# Patient Record
Sex: FEMALE | Race: WHITE | NOT HISPANIC OR LATINO | Employment: OTHER | URBAN - METROPOLITAN AREA
[De-identification: names, ages, dates, MRNs, and addresses within clinical notes are randomized per-mention and may not be internally consistent; named-entity substitution may affect disease eponyms.]

---

## 2018-08-28 ENCOUNTER — HOSPITAL ENCOUNTER (OUTPATIENT)
Dept: FAMILY MEDICINE CLINIC | Facility: CLINIC | Age: 63
Setting detail: SPECIMEN
Discharge: HOME | End: 2018-08-28
Attending: NURSE PRACTITIONER | Admitting: NURSE PRACTITIONER

## 2018-08-28 LAB
ANION GAP SERPL CALC-SCNC: 10 MMOL/L (ref 10–20)
BUN SERPL-MCNC: 9 MG/DL (ref 8–20)
BUN/CREAT SERPL: 10 (ref 5.4–26.2)
CALCIUM SERPL-MCNC: 9 MG/DL (ref 8.9–10.3)
CHLORIDE SERPL-SCNC: 105 MMOL/L (ref 101–111)
CHOLEST SERPL-MCNC: 155 MG/DL
CHOLEST/HDLC SERPL: 3.6 {RATIO}
CONV CO2: 30 MMOL/L (ref 22–32)
CONV LDL CHOLESTEROL DIRECT: 93 MG/DL (ref 0–100)
CREAT UR-MCNC: 0.9 MG/DL (ref 0.4–1)
GLUCOSE SERPL-MCNC: 110 MG/DL (ref 65–99)
HDLC SERPL-MCNC: 43 MG/DL
LDLC/HDLC SERPL: 2.1 {RATIO}
LIPID INTERPRETATION: NORMAL
POTASSIUM SERPL-SCNC: 4 MMOL/L (ref 3.6–5.1)
SODIUM SERPL-SCNC: 141 MMOL/L (ref 136–144)
TRIGL SERPL-MCNC: 104 MG/DL
VLDLC SERPL CALC-MCNC: 18.8 MG/DL

## 2019-05-16 ENCOUNTER — HOSPITAL ENCOUNTER (OUTPATIENT)
Dept: FAMILY MEDICINE CLINIC | Facility: CLINIC | Age: 64
Setting detail: SPECIMEN
Discharge: HOME | End: 2019-05-16
Attending: NURSE PRACTITIONER | Admitting: NURSE PRACTITIONER

## 2019-05-16 LAB
BACTERIA SPEC AEROBE CULT: NORMAL
BILIRUB UR QL STRIP: NEGATIVE MG/DL
CASTS URNS QL MICRO: ABNORMAL /[LPF]
COLOR UR: ABNORMAL
CONV BACTERIA IN URINE MICRO: NEGATIVE
CONV CLARITY OF URINE: ABNORMAL
CONV HYALINE CASTS IN URINE MICRO: 0 /[LPF] (ref 0–5)
CONV HYALINE CASTS IN URINE MICRO: ABNORMAL
CONV PROTEIN IN URINE BY AUTOMATED TEST STRIP: NEGATIVE MG/DL
CONV SMALL ROUND CELLS: ABNORMAL /[HPF]
CONV UROBILINOGEN IN URINE BY AUTOMATED TEST STRIP: 0.2 MG/DL
CULTURE INDICATED?: ABNORMAL
GLUCOSE UR QL: NEGATIVE MG/DL
HGB UR QL STRIP: ABNORMAL
KETONES UR QL STRIP: NEGATIVE MG/DL
LEUKOCYTE ESTERASE UR QL STRIP: ABNORMAL
Lab: NORMAL
MICRO REPORT STATUS: NORMAL
NITRITE UR QL STRIP: POSITIVE
PH UR STRIP.AUTO: 5.5 [PH] (ref 4.5–8)
RBC #/AREA URNS HPF: 3 /[HPF] (ref 0–3)
SP GR UR: 1.02 (ref 1–1.03)
SPECIMEN SOURCE: NORMAL
SPERM URNS QL MICRO: ABNORMAL /[HPF]
SQUAMOUS SPT QL MICRO: 33 /[HPF] (ref 0–5)
UNIDENT CRYS URNS QL MICRO: ABNORMAL /[HPF]
WBC #/AREA URNS HPF: 13 /[HPF] (ref 0–5)
YEAST SPEC QL WET PREP: ABNORMAL /[HPF]

## 2019-07-01 RX ORDER — ALBUTEROL SULFATE 90 UG/1
AEROSOL, METERED RESPIRATORY (INHALATION)
Qty: 1 INHALER | Refills: 5 | Status: SHIPPED | OUTPATIENT
Start: 2019-07-01 | End: 2021-04-02 | Stop reason: SDUPTHER

## 2019-08-02 ENCOUNTER — OFFICE VISIT (OUTPATIENT)
Dept: FAMILY MEDICINE CLINIC | Facility: CLINIC | Age: 64
End: 2019-08-02

## 2019-08-02 ENCOUNTER — LAB REQUISITION (OUTPATIENT)
Dept: LAB | Facility: HOSPITAL | Age: 64
End: 2019-08-02

## 2019-08-02 VITALS
SYSTOLIC BLOOD PRESSURE: 158 MMHG | TEMPERATURE: 98.6 F | DIASTOLIC BLOOD PRESSURE: 96 MMHG | OXYGEN SATURATION: 96 % | BODY MASS INDEX: 37.37 KG/M2 | HEART RATE: 86 BPM | WEIGHT: 210.9 LBS | RESPIRATION RATE: 20 BRPM | HEIGHT: 63 IN

## 2019-08-02 DIAGNOSIS — R31.9 HEMATURIA, UNSPECIFIED TYPE: Primary | ICD-10-CM

## 2019-08-02 DIAGNOSIS — R31.9 HEMATURIA: ICD-10-CM

## 2019-08-02 LAB
BILIRUB BLD-MCNC: NEGATIVE MG/DL
CLARITY, POC: ABNORMAL
COLOR UR: YELLOW
GLUCOSE UR STRIP-MCNC: NEGATIVE MG/DL
KETONES UR QL: NEGATIVE
LEUKOCYTE EST, POC: NEGATIVE
NITRITE UR-MCNC: NEGATIVE MG/ML
PH UR: 7 [PH] (ref 5–8)
PROT UR STRIP-MCNC: ABNORMAL MG/DL
RBC # UR STRIP: ABNORMAL /UL
SP GR UR: 1.01 (ref 1–1.03)
UROBILINOGEN UR QL: NORMAL

## 2019-08-02 PROCEDURE — 87086 URINE CULTURE/COLONY COUNT: CPT | Performed by: NURSE PRACTITIONER

## 2019-08-02 PROCEDURE — 81003 URINALYSIS AUTO W/O SCOPE: CPT | Performed by: NURSE PRACTITIONER

## 2019-08-02 PROCEDURE — 99212 OFFICE O/P EST SF 10 MIN: CPT | Performed by: NURSE PRACTITIONER

## 2019-08-02 NOTE — PROGRESS NOTES
Subjective   Quynh Du is a 63 y.o. female.     Chief Complaint   Patient presents with   • Blood in Urine     X 1 week   • Urinary Frequency       HPI  She is here says increased urination but she is drinking 64 ozs day. She had low abd pain. Happened one week ago she saw bright red blood in toilet. None of recent. Has urgency. No fevers no vomiting  Urine dip in office non hemolyzed trace will send for culture and sensitivity.  She has had blood in urine in past. No kidney stones as reported.       The following portions of the patient's history were reviewed and updated as appropriate: allergies, current medications, past family history, past medical history, past social history, past surgical history and problem list.      Current Outpatient Medications:   •  ALBUTEROL SULFATE  (90 Base) MCG/ACT inhaler, INHALE TWO PUFFS BY MOUTH EVERY 4 HOURS AS NEEDED, Disp: 1 inhaler, Rfl: 5    Recent Results (from the past 4032 hour(s))   Urinalysis With Culture If Indicated -    Collection Time: 05/16/19  8:55 AM   Result Value Ref Range    Color, UA DARK YELLOW (A) YELLOW    Appearance HAZY (A) CLEAR    Glucose, UA NEGATIVE NEGATIVE mg/dL    Bilirubin, UA NEGATIVE NEGATIVE mg/dL    Protein, UA NEGATIVE NEGATIVE mg/dL    pH, UA 5.5 4.5 - 8.0    Specific Gravity, UA 1.019 1.005 - 1.030    Blood, UA MODERATE (A) NEGATIVE    Ketones, UA NEGATIVE NEGATIVE mg/dL    Urobilinogen, UA 0.2 <2.0 mg/dL    Nitrite, UA POSITIVE (A) NEGATIVE    Leukocytes, UA TRACE (A) NEGATIVE    RBC, UA 3 0 - 3 /[HPF]    WBC, UA 13 (H) 0 - 5 /[HPF]    Bacteria, UA NEGATIVE NEGATIVE    Epithelial Cells, UA 33 (H) 0 - 5 /[HPF]    Hyaline Casts, UA 0 0 - 5 /[LPF]    Hyaline Casts, UA MICROSCOPIC WAS CHECKED ON UNSPUN URINE.     Casts NONE NONE /[LPF]    Crystals, UA NONE NONE /[HPF]    Small Round Cells NONE NONE /[HPF]    Yeast, UA NONE NONE /[HPF]    Sperm NONE NONE /[HPF]    Culture Indicated? CULTURE INDICATED AND SET UP    Urine Culture  "- Urine,    Collection Time: 05/16/19  8:55 AM   Result Value Ref Range    Specimen Description: URINE     Special Requests SPEC REFRIG     Culture       More than 2 organisms isolated. By Lab criteria this is considered    Culture       contamination, no further workup will be performed. Please submit another    Culture specimen.     Report Status 05/17/2019 FINAL    POCT urinalysis dipstick, automated    Collection Time: 08/02/19  3:12 PM   Result Value Ref Range    Color Yellow Yellow, Straw, Dark Yellow, Dang    Clarity, UA Hazy (A) Clear    Specific Gravity  1.015 1.005 - 1.030    pH, Urine 7.0 5.0 - 8.0    Leukocytes Negative Negative    Nitrite, UA Negative Negative    Protein, POC Trace (A) Negative mg/dL    Glucose, UA Negative Negative, 1000 mg/dL (3+) mg/dL    Ketones, UA Negative Negative    Urobilinogen, UA Normal Normal    Bilirubin Negative Negative    Blood, UA Trace (A) Negative         Review of Systems   Constitutional: Negative for chills and fever.   HENT: Negative for congestion and sinus pressure.    Eyes: Negative for blurred vision and pain.   Respiratory: Negative for cough and shortness of breath.    Cardiovascular: Negative for chest pain and leg swelling.   Gastrointestinal: Negative for abdominal pain, blood in stool, diarrhea, nausea, GERD and indigestion.   Endocrine: Negative for cold intolerance, heat intolerance, polydipsia, polyphagia and polyuria.   Genitourinary: Positive for hematuria.        Saw bright red blood in urine last week.    Skin: Negative for dry skin, rash and bruise.   Psychiatric/Behavioral: Negative for dysphoric mood and stress.       Objective     /96 (BP Location: Left arm, Patient Position: Sitting, Cuff Size: Adult)   Pulse 86   Temp 98.6 °F (37 °C) (Oral)   Resp 20   Ht 160 cm (63\")   Wt 95.7 kg (210 lb 14.4 oz)   SpO2 96%   BMI 37.36 kg/m²     Physical Exam   Constitutional: She is oriented to person, place, and time. She appears " well-developed and well-nourished.   HENT:   Head: Normocephalic and atraumatic.   Right Ear: External ear normal.   Left Ear: External ear normal.   Nose: Nose normal.   Mouth/Throat: Oropharynx is clear and moist.   Eyes: Pupils are equal, round, and reactive to light.   Neck: Normal range of motion. Neck supple.   Cardiovascular: Normal rate and regular rhythm.   Pulmonary/Chest: Effort normal and breath sounds normal.   Abdominal: Soft. Bowel sounds are normal.   Musculoskeletal: Normal range of motion.   Neurological: She is alert and oriented to person, place, and time.   Skin: Skin is warm and dry.   Psychiatric: She has a normal mood and affect. Her behavior is normal.   Nursing note and vitals reviewed.        Assessment/Plan   Quynh was seen today for blood in urine and urinary frequency.    Diagnoses and all orders for this visit:    Hematuria, unspecified type  -     POCT urinalysis dipstick, automated  -     Urine Culture - Urine, Urine, Clean Catch; Future      Patient Instructions   Call mon for results of testing.   If fever vomiting return to office      ALYSON Wray    08/02/19

## 2019-08-03 LAB — BACTERIA SPEC AEROBE CULT: NORMAL

## 2019-08-05 ENCOUNTER — DOCUMENTATION (OUTPATIENT)
Dept: FAMILY MEDICINE CLINIC | Facility: CLINIC | Age: 64
End: 2019-08-05

## 2019-11-13 RX ORDER — BUPROPION HYDROCHLORIDE 150 MG/1
TABLET, EXTENDED RELEASE ORAL
Qty: 90 TABLET | Refills: 0 | Status: SHIPPED | OUTPATIENT
Start: 2019-11-13 | End: 2020-12-09

## 2020-01-13 ENCOUNTER — OFFICE VISIT (OUTPATIENT)
Dept: FAMILY MEDICINE CLINIC | Facility: CLINIC | Age: 65
End: 2020-01-13

## 2020-01-13 VITALS
HEIGHT: 63 IN | BODY MASS INDEX: 35.79 KG/M2 | HEART RATE: 90 BPM | TEMPERATURE: 98 F | DIASTOLIC BLOOD PRESSURE: 97 MMHG | OXYGEN SATURATION: 94 % | WEIGHT: 202 LBS | SYSTOLIC BLOOD PRESSURE: 169 MMHG | RESPIRATION RATE: 18 BRPM

## 2020-01-13 DIAGNOSIS — J22 ACUTE RESPIRATORY INFECTION: Primary | ICD-10-CM

## 2020-01-13 DIAGNOSIS — J44.9 CHRONIC OBSTRUCTIVE PULMONARY DISEASE, UNSPECIFIED COPD TYPE (HCC): ICD-10-CM

## 2020-01-13 PROCEDURE — 99213 OFFICE O/P EST LOW 20 MIN: CPT | Performed by: FAMILY MEDICINE

## 2020-01-13 RX ORDER — BUDESONIDE AND FORMOTEROL FUMARATE DIHYDRATE 160; 4.5 UG/1; UG/1
2 AEROSOL RESPIRATORY (INHALATION) 2 TIMES DAILY
COMMUNITY
Start: 2018-06-13 | End: 2020-05-18

## 2020-01-13 RX ORDER — AZITHROMYCIN 500 MG/1
500 TABLET, FILM COATED ORAL DAILY
Qty: 3 TABLET | Refills: 0 | Status: SHIPPED | OUTPATIENT
Start: 2020-01-13 | End: 2020-12-09

## 2020-01-13 RX ORDER — PREDNISONE 20 MG/1
20 TABLET ORAL DAILY
Qty: 14 TABLET | Refills: 0 | Status: SHIPPED | OUTPATIENT
Start: 2020-01-13 | End: 2020-12-09

## 2020-01-13 NOTE — PROGRESS NOTES
"Subjective   Quynh Du is a 64 y.o. female.     Chief Complaint   Patient presents with   • Shortness of Breath     over a week   • Cough   • Wheezing       HPI  Chief complaint: Respiratory infection    Patient is a 64-year-old white male states he became ill about 10 days ago.  She states developed sudden onset of sore throat sinus congestion and drainage.  States initially low-grade fever.  Patient states that she continues to have cough and wheezing.  States her cough is productive of sputum.  States her sinus congestion and drainage is improved.        The following portions of the patient's history were reviewed and updated as appropriate: allergies, current medications, past family history, past medical history, past social history, past surgical history and problem list.    Review of Systems    Objective     /97 (BP Location: Left arm, Patient Position: Sitting, Cuff Size: Adult)   Pulse 90   Temp 98 °F (36.7 °C) (Oral)   Resp 18   Ht 160 cm (63\")   Wt 91.6 kg (202 lb)   SpO2 94%   BMI 35.78 kg/m²     Physical Exam   Constitutional: She is oriented to person, place, and time. She appears well-developed and well-nourished.   HENT:   Head: Normocephalic and atraumatic.   Eyes: Pupils are equal, round, and reactive to light. Conjunctivae and EOM are normal.   Neck: Normal range of motion. Neck supple.   Cardiovascular: Normal rate, regular rhythm, normal heart sounds and intact distal pulses.   Pulmonary/Chest: Effort normal. She has decreased breath sounds in the right lower field and the left lower field. She has wheezes in the right middle field, the right lower field, the left middle field and the left lower field. She has rales in the right lower field and the left lower field.   Abdominal: Soft. Bowel sounds are normal.   Musculoskeletal: Normal range of motion.   Neurological: She is alert and oriented to person, place, and time.   Skin: Skin is warm and dry.   Psychiatric: She has a " normal mood and affect. Her behavior is normal.   Nursing note and vitals reviewed.        Assessment/Plan   Quynh was seen today for shortness of breath, cough and wheezing.    Diagnoses and all orders for this visit:    Acute respiratory infection    Chronic obstructive pulmonary disease, unspecified COPD type (CMS/HCC)      Patient Instructions   Rest; get plenty of fluids.    Use tylenol for aches and fever.    Take the anti-biotics and use the prednisone as prescribed.    Follow up in the office in 1 week if no better or sooner if needed.      Alfonso Moise Jr., MD    01/13/20

## 2020-01-13 NOTE — PATIENT INSTRUCTIONS
Rest; get plenty of fluids.    Use tylenol for aches and fever.    Take the anti-biotics and use the prednisone as prescribed.    Follow up in the office in 1 week if no better or sooner if needed.

## 2020-05-18 RX ORDER — BUDESONIDE AND FORMOTEROL FUMARATE DIHYDRATE 160; 4.5 UG/1; UG/1
AEROSOL RESPIRATORY (INHALATION)
Qty: 10.2 G | Refills: 2 | Status: SHIPPED | OUTPATIENT
Start: 2020-05-18 | End: 2020-12-09

## 2020-12-09 ENCOUNTER — LAB (OUTPATIENT)
Dept: LAB | Facility: HOSPITAL | Age: 65
End: 2020-12-09

## 2020-12-09 ENCOUNTER — OFFICE VISIT (OUTPATIENT)
Dept: FAMILY MEDICINE CLINIC | Facility: CLINIC | Age: 65
End: 2020-12-09

## 2020-12-09 VITALS
HEIGHT: 63 IN | DIASTOLIC BLOOD PRESSURE: 97 MMHG | OXYGEN SATURATION: 94 % | SYSTOLIC BLOOD PRESSURE: 162 MMHG | TEMPERATURE: 97.1 F | WEIGHT: 202.6 LBS | BODY MASS INDEX: 35.9 KG/M2 | HEART RATE: 83 BPM | RESPIRATION RATE: 18 BRPM

## 2020-12-09 DIAGNOSIS — Z13.9 ENCOUNTER FOR HEALTH-RELATED SCREENING: ICD-10-CM

## 2020-12-09 DIAGNOSIS — Z13.9 ENCOUNTER FOR HEALTH-RELATED SCREENING: Primary | ICD-10-CM

## 2020-12-09 DIAGNOSIS — Z00.00 MEDICARE ANNUAL WELLNESS VISIT, INITIAL: ICD-10-CM

## 2020-12-09 LAB
ANION GAP SERPL CALCULATED.3IONS-SCNC: 8.2 MMOL/L (ref 5–15)
BUN SERPL-MCNC: 11 MG/DL (ref 8–23)
BUN/CREAT SERPL: 13.9 (ref 7–25)
CALCIUM SPEC-SCNC: 9.4 MG/DL (ref 8.6–10.5)
CHLORIDE SERPL-SCNC: 99 MMOL/L (ref 98–107)
CHOLEST SERPL-MCNC: 172 MG/DL (ref 0–200)
CO2 SERPL-SCNC: 31.8 MMOL/L (ref 22–29)
CREAT SERPL-MCNC: 0.79 MG/DL (ref 0.57–1)
GFR SERPL CREATININE-BSD FRML MDRD: 73 ML/MIN/1.73
GLUCOSE SERPL-MCNC: 117 MG/DL (ref 65–99)
HDLC SERPL-MCNC: 44 MG/DL (ref 40–60)
LDLC SERPL CALC-MCNC: 98 MG/DL (ref 0–100)
LDLC/HDLC SERPL: 2.11 {RATIO}
POTASSIUM SERPL-SCNC: 3.9 MMOL/L (ref 3.5–5.2)
SODIUM SERPL-SCNC: 139 MMOL/L (ref 136–145)
T4 FREE SERPL-MCNC: 1.21 NG/DL (ref 0.93–1.7)
TRIGL SERPL-MCNC: 176 MG/DL (ref 0–150)
TSH SERPL DL<=0.05 MIU/L-ACNC: 2.35 UIU/ML (ref 0.27–4.2)
VLDLC SERPL-MCNC: 30 MG/DL (ref 5–40)

## 2020-12-09 PROCEDURE — 80061 LIPID PANEL: CPT

## 2020-12-09 PROCEDURE — 36415 COLL VENOUS BLD VENIPUNCTURE: CPT

## 2020-12-09 PROCEDURE — 84439 ASSAY OF FREE THYROXINE: CPT

## 2020-12-09 PROCEDURE — G0438 PPPS, INITIAL VISIT: HCPCS | Performed by: NURSE PRACTITIONER

## 2020-12-09 PROCEDURE — 84443 ASSAY THYROID STIM HORMONE: CPT

## 2020-12-09 PROCEDURE — 80048 BASIC METABOLIC PNL TOTAL CA: CPT

## 2020-12-09 NOTE — PROGRESS NOTES
"Subjective   Quynh Du is a 64 y.o. female.     Chief Complaint   Patient presents with   • Medicare Wellness-Initial Visit       HPI      The following portions of the patient's history were reviewed and updated as appropriate: allergies, current medications, past family history, past medical history, past social history, past surgical history and problem list.      Current Outpatient Medications:   •  ALBUTEROL SULFATE  (90 Base) MCG/ACT inhaler, INHALE TWO PUFFS BY MOUTH EVERY 4 HOURS AS NEEDED, Disp: 1 inhaler, Rfl: 5  •  SYMBICORT 160-4.5 MCG/ACT inhaler, INHALE TWO PUFFS BY MOUTH TWICE A DAY, Disp: 10.2 g, Rfl: 2  •  buPROPion SR (WELLBUTRIN SR) 150 MG 12 hr tablet, TAKE ONE TABLET BY MOUTH DAILY, Disp: 90 tablet, Rfl: 0    No results found for this or any previous visit (from the past 4032 hour(s)).      Review of Systems    Objective     /97 (BP Location: Left arm, Patient Position: Sitting, Cuff Size: Large Adult)   Pulse 83   Temp 97.1 °F (36.2 °C) (Infrared)   Resp 18   Ht 160 cm (63\")   Wt 91.9 kg (202 lb 9.6 oz)   SpO2 94%   BMI 35.89 kg/m²     Physical Exam      Assessment/Plan   There are no diagnoses linked to this encounter.  There are no Patient Instructions on file for this visit.    Cheryl Perez, APRN    12/09/20      "

## 2020-12-09 NOTE — PROGRESS NOTES
"The ABCs of the Annual Wellness Visit  Initial Medicare Wellness Visit    No chief complaint on file.      Subjective   History of Present Illness:  Quynh Du is a 64 y.o. female who presents for an Initial Medicare Wellness Visit.    HEALTH RISK ASSESSMENT    Recent Hospitalizations:  {Hospitalization history:[id number]::\"No hospitalization(s) within the last year.\"}    Current Medical Providers:  Patient Care Team:  Cheryl Perez APRN as PCP - General (Nurse Practitioner)  Alfonso Moise Jr., MD as PCP - Family Medicine (Family Medicine)    Smoking Status:  Social History     Tobacco Use   Smoking Status Former Smoker   • Quit date: 2009   • Years since quittin.3   Smokeless Tobacco Never Used       Alcohol Consumption:  Social History     Substance and Sexual Activity   Alcohol Use Yes    Comment: socially       Depression Screen:   No flowsheet data found.    Fall Risk Screen:  KEMADI Fall Risk Assessment has not been completed.    Health Habits and Functional and Cognitive Screening:  No flowsheet data found.      Does the patient have evidence of cognitive impairment? {Yes/No w/ pre-defaulted No:37230::\"No\"}    Asprin use counseling:{Aspirin :14429}    Age-appropriate Screening Schedule:  Refer to the list below for future screening recommendations based on patient's age, sex and/or medical conditions. Orders for these recommended tests are listed in the plan section. The patient has been provided with a written plan.    Health Maintenance   Topic Date Due   • MAMMOGRAM  1955   • COLONOSCOPY  1955   • TDAP/TD VACCINES (1 - Tdap) 1973   • ZOSTER VACCINE (1 of 2) 2004   • PAP SMEAR  2019   • INFLUENZA VACCINE  2020          The following portions of the patient's history were reviewed and updated as appropriate: {history reviewed:::\"allergies\",\"current medications\",\"past family history\",\"past medical history\",\"past social history\",\"past " "surgical history\",\"problem list\"}.    Outpatient Medications Prior to Visit   Medication Sig Dispense Refill   • ALBUTEROL SULFATE  (90 Base) MCG/ACT inhaler INHALE TWO PUFFS BY MOUTH EVERY 4 HOURS AS NEEDED 1 inhaler 5   • buPROPion SR (WELLBUTRIN SR) 150 MG 12 hr tablet TAKE ONE TABLET BY MOUTH DAILY 90 tablet 0   • SYMBICORT 160-4.5 MCG/ACT inhaler INHALE TWO PUFFS BY MOUTH TWICE A DAY 10.2 g 2   • azithromycin (ZITHROMAX) 500 MG tablet Take 1 tablet by mouth Daily. 3 tablet 0   • predniSONE (DELTASONE) 20 MG tablet Take 1 tablet by mouth Daily. 14 tablet 0     No facility-administered medications prior to visit.        Patient Active Problem List   Diagnosis   • Chronic obstructive pulmonary disease (CMS/[facility])   • Depression   • Hematuria   • Obesity   • Acute respiratory infection       Advanced Care Planning:  {Advanced Directive Status:73683}    Review of Systems    Compared to one year ago, the patient feels her physical health is {better worse same:49674}.  Compared to one year ago, the patient feels her mental health is {better worse same:30969}.    Reviewed chart for potential of high risk medication in the elderly: {Response;Yes/No/NA:[id number]::\"yes\"}  Reviewed chart for potential of harmful drug interactions in the elderly:{Response;Yes/No/NA:[id number]::\"yes\"}    Objective       There were no vitals filed for this visit.    There is no height or weight on file to calculate BMI.  Discussed the patient's BMI with her. The BMI {[facility]53741}.    Physical Exam          Assessment/Plan   Medicare Risks and Personalized Health Plan  CMS Preventative Services Quick Reference  {Medicare Wellness Risk Factors and Personalized Health Plan:66977}    The above risks/problems have been discussed with the patient.  Pertinent information has been shared with the patient in the After Visit Summary.  Follow up plans and orders are seen below in the Assessment/Plan Section.    There are no " diagnoses linked to this encounter.  Follow Up:  No follow-ups on file.     An After Visit Summary and PPPS were given to the patient.

## 2020-12-09 NOTE — PROGRESS NOTES
The ABCs of the Annual Wellness Visit  Initial Medicare Wellness Visit    Chief Complaint   Patient presents with   • Medicare Wellness-Initial Visit       Subjective   History of Present Illness:  Quynh Du is a 64 y.o. female who presents for an Initial Medicare Wellness Visit.    HEALTH RISK ASSESSMENT    Recent Hospitalizations:  No hospitalization(s) within the last year.    Current Medical Providers:  Patient Care Team:  Cheryl Perez APRN as PCP - General (Nurse Practitioner)  Alfonso Moise Jr., MD as PCP - Family Medicine (Family Medicine)    Smoking Status:  Social History     Tobacco Use   Smoking Status Former Smoker   • Quit date: 2009   • Years since quittin.3   Smokeless Tobacco Never Used       Alcohol Consumption:  Social History     Substance and Sexual Activity   Alcohol Use Yes    Comment: socially       Depression Screen:   PHQ-2/PHQ-9 Depression Screening 2020   Little interest or pleasure in doing things 0   Feeling down, depressed, or hopeless 1   Total Score 1       Fall Risk Screen:  KEMADI Fall Risk Assessment was completed, and patient is at LOW risk for falls.Assessment completed on:2020    Health Habits and Functional and Cognitive Screening:  Functional & Cognitive Status 2020   Do you have difficulty preparing food and eating? No   Do you have difficulty bathing yourself, getting dressed or grooming yourself? No   Do you have difficulty using the toilet? No   Do you have difficulty moving around from place to place? No   Do you have trouble with steps or getting out of a bed or a chair? No   Current Diet Well Balanced Diet   Dental Exam Not up to date   Eye Exam Not up to date   Exercise (times per week) 1 times per week   Current Exercise Activities Include No Regular Exercise   Do you need help using the phone?  No   Are you deaf or do you have serious difficulty hearing?  No   Do you need help with transportation? No   Do you need help shopping?  No   Do you need help preparing meals?  No   Do you need help with housework?  No   Do you need help with laundry? No   Do you need help taking your medications? No   Do you need help managing money? No   Do you ever drive or ride in a car without wearing a seat belt? No   Have you felt unusual stress, anger or loneliness in the last month? No   Who do you live with? Alone   If you need help, do you have trouble finding someone available to you? No   Have you been bothered in the last four weeks by sexual problems? No   Do you have difficulty concentrating, remembering or making decisions? No         Does the patient have evidence of cognitive impairment? No    Asprin use counseling:Does not need ASA (and currently is not on it)    Age-appropriate Screening Schedule:  Refer to the list below for future screening recommendations based on patient's age, sex and/or medical conditions. Orders for these recommended tests are listed in the plan section. The patient has been provided with a written plan.    Health Maintenance   Topic Date Due   • MAMMOGRAM  1955   • COLONOSCOPY  1955   • TDAP/TD VACCINES (1 - Tdap) 12/21/1973   • ZOSTER VACCINE (1 of 2) 12/21/2004   • PAP SMEAR  08/02/2019   • INFLUENZA VACCINE  08/01/2020          The following portions of the patient's history were reviewed and updated as appropriate: allergies, current medications, past family history, past medical history, past social history, past surgical history and problem list.    Outpatient Medications Prior to Visit   Medication Sig Dispense Refill   • ALBUTEROL SULFATE  (90 Base) MCG/ACT inhaler INHALE TWO PUFFS BY MOUTH EVERY 4 HOURS AS NEEDED 1 inhaler 5   • SYMBICORT 160-4.5 MCG/ACT inhaler INHALE TWO PUFFS BY MOUTH TWICE A DAY 10.2 g 2   • azithromycin (ZITHROMAX) 500 MG tablet Take 1 tablet by mouth Daily. 3 tablet 0   • buPROPion SR (WELLBUTRIN SR) 150 MG 12 hr tablet TAKE ONE TABLET BY MOUTH DAILY 90 tablet 0   •  "predniSONE (DELTASONE) 20 MG tablet Take 1 tablet by mouth Daily. 14 tablet 0     No facility-administered medications prior to visit.        Patient Active Problem List   Diagnosis   • Chronic obstructive pulmonary disease (CMS/HCC)   • Depression   • Hematuria   • Obesity   • Acute respiratory infection       Advanced Care Planning:  ACP discussion was held with the patient during this visit. Patient does not have an advance directive, information provided.    Review of Systems    Compared to one year ago, the patient feels her physical health is the same.  Compared to one year ago, the patient feels her mental health is worse.    Reviewed chart for potential of high risk medication in the elderly: not applicable  Reviewed chart for potential of harmful drug interactions in the elderly:not applicable    Objective         Vitals:    12/09/20 0954   BP: 162/97   BP Location: Left arm   Patient Position: Sitting   Cuff Size: Large Adult   Pulse: 83   Resp: 18   Temp: 97.1 °F (36.2 °C)   TempSrc: Infrared   SpO2: 94%   Weight: 91.9 kg (202 lb 9.6 oz)   Height: 160 cm (63\")       Body mass index is 35.89 kg/m².  Discussed the patient's BMI with her. The BMI is above average; BMI management plan is completed.    Physical Exam          Assessment/Plan   Medicare Risks and Personalized Health Plan  CMS Preventative Services Quick Reference  Advance Directive Discussion    The above risks/problems have been discussed with the patient.  Pertinent information has been shared with the patient in the After Visit Summary.  Follow up plans and orders are seen below in the Assessment/Plan Section.    Diagnoses and all orders for this visit:    1. Encounter for health-related screening (Primary)  -     TSH; Future  -     T4, free; Future  -     Lipid Panel; Future  -     Basic Metabolic Panel; Future      Follow Up:  No follow-ups on file.     An After Visit Summary and PPPS were given to the patient.           "

## 2020-12-09 NOTE — PATIENT INSTRUCTIONS
Advance Care Planning and Advance Directives     You make decisions on a daily basis - decisions about where you want to live, your career, your home, your life. Perhaps one of the most important decisions you face is your choice for future medical care. Take time to talk with your family and your healthcare team and start planning today.  Advance Care Planning is a process that can help you:  · Understand possible future healthcare decisions in light of your own experiences  · Reflect on those decision in light of your goals and values  · Discuss your decisions with those closest to you and the healthcare professionals that care for you  · Make a plan by creating a document that reflects your wishes    Surrogate Decision Maker  In the event of a medical emergency, which has left you unable to communicate or to make your own decisions, you would need someone to make decisions for you.  It is important to discuss your preferences for medical treatment with this person while you are in good health.     Qualities of a surrogate decision maker:  • Willing to take on this role and responsibility  • Knows what you want for future medical care  • Willing to follow your wishes even if they don't agree with them  • Able to make difficult medical decisions under stressful circumstances    Advance Directives  These are legal documents you can create that will guide your healthcare team and decision maker(s) when needed. These documents can be stored in the electronic medical record.    · Living Will - a legal document to guide your care if you have a terminal condition or a serious illness and are unable to communicate. States vary by statute in document names/types, but most forms may include one or more of the following:        -  Directions regarding life-prolonging treatments        -  Directions regarding artificially provided nutrition/hydration        -  Choosing a healthcare decision maker        -  Direction  regarding organ/tissue donation    · Durable Power of  for Healthcare - this document names an -in-fact to make medical decisions for you, but it may also allow this person to make personal and financial decisions for you. Please seek the advice of an  if you need this type of document.    **Advance Directives are not required and no one may discriminate against you if you do not sign one.    Medical Orders  Many states allow specific forms/orders signed by your physician to record your wishes for medical treatment in your current state of health. This form, signed in personal communication with your physician, addresses resuscitation and other medical interventions that you may or may not want.      For more information or to schedule a time with a [facility] Advance Care Planning Facilitator contact: [facility] or call [phone] and someone will contact you directly.  Exercising to Lose Weight  Exercise is structured, repetitive physical activity to improve fitness and health. Getting regular exercise is important for everyone. It is especially important if you are overweight. Being overweight increases your risk of heart disease, stroke, diabetes, high blood pressure, and several types of cancer. Reducing your calorie intake and exercising can help you lose weight.  Exercise is usually categorized as moderate or vigorous intensity. To lose weight, most people need to do a certain amount of moderate-intensity or vigorous-intensity exercise each week.  Moderate-intensity exercise    Moderate-intensity exercise is any activity that gets you moving enough to burn at least three times more energy (calories) than if you were sitting.  Examples of moderate exercise include:  · Walking a mile in 15 minutes.  · Doing light yard work.  · Biking at an easy pace.  Most people should get at least 150 minutes (2 hours and 30 minutes) a week of moderate-intensity exercise to maintain  their body weight.  Vigorous-intensity exercise  Vigorous-intensity exercise is any activity that gets you moving enough to burn at least six times more calories than if you were sitting. When you exercise at this intensity, you should be working hard enough that you are not able to carry on a conversation.  Examples of vigorous exercise include:  · Running.  · Playing a team sport, such as football, basketball, and soccer.  · Jumping rope.  Most people should get at least 75 minutes (1 hour and 15 minutes) a week of vigorous-intensity exercise to maintain their body weight.  How can exercise affect me?  When you exercise enough to burn more calories than you eat, you lose weight. Exercise also reduces body fat and builds muscle. The more muscle you have, the more calories you burn. Exercise also:  · Improves mood.  · Reduces stress and tension.  · Improves your overall fitness, flexibility, and endurance.  · Increases bone strength.  The amount of exercise you need to lose weight depends on:  · Your age.  · The type of exercise.  · Any health conditions you have.  · Your overall physical ability.  Talk to your health care provider about how much exercise you need and what types of activities are safe for you.  What actions can I take to lose weight?  Nutrition    · Make changes to your diet as told by your health care provider or diet and nutrition specialist (dietitian). This may include:  ? Eating fewer calories.  ? Eating more protein.  ? Eating less unhealthy fats.  ? Eating a diet that includes fresh fruits and vegetables, whole grains, low-fat dairy products, and lean protein.  ? Avoiding foods with added fat, salt, and sugar.  · Drink plenty of water while you exercise to prevent dehydration or heat stroke.  Activity  · Choose an activity that you enjoy and set realistic goals. Your health care provider can help you make an exercise plan that works for you.  · Exercise at a moderate or vigorous intensity  most days of the week.  ? The intensity of exercise may vary from person to person. You can tell how intense a workout is for you by paying attention to your breathing and heartbeat. Most people will notice their breathing and heartbeat get faster with more intense exercise.  · Do resistance training twice each week, such as:  ? Push-ups.  ? Sit-ups.  ? Lifting weights.  ? Using resistance bands.  · Getting short amounts of exercise can be just as helpful as long structured periods of exercise. If you have trouble finding time to exercise, try to include exercise in your daily routine.  ? Get up, stretch, and walk around every 30 minutes throughout the day.  ? Go for a walk during your lunch break.  ? Park your car farther away from your destination.  ? If you take public transportation, get off one stop early and walk the rest of the way.  ? Make phone calls while standing up and walking around.  ? Take the stairs instead of elevators or escalators.  · Wear comfortable clothes and shoes with good support.  · Do not exercise so much that you hurt yourself, feel dizzy, or get very short of breath.  Where to find more information  · [place] Department of Health and Human Services: www.hhs.gov  · Centers for Disease Control and Prevention (CDC): www.cdc.gov  Contact a health care provider:  · Before starting a new exercise program.  · If you have questions or concerns about your weight.  · If you have a medical problem that keeps you from exercising.  Get help right away if you have any of the following while exercising:  · Injury.  · Dizziness.  · Difficulty breathing or shortness of breath that does not go away when you stop exercising.  · Chest pain.  · Rapid heartbeat.  Summary  · Being overweight increases your risk of heart disease, stroke, diabetes, high blood pressure, and several types of cancer.  · Losing weight happens when you burn more calories than you eat.  · Reducing the amount of calories you eat in  addition to getting regular moderate or vigorous exercise each week helps you lose weight.  This information is not intended to replace advice given to you by your health care provider. Make sure you discuss any questions you have with your health care provider.  Document Revised: 12/31/2018 Document Reviewed: 12/31/2018  [employer] Patient Education © 2020 [employer]    WALK DAILY.  FOLLOW UP ON LABS.

## 2021-04-02 RX ORDER — ALBUTEROL SULFATE 90 UG/1
2 AEROSOL, METERED RESPIRATORY (INHALATION) EVERY 4 HOURS PRN
Qty: 18 G | Refills: 3 | Status: SHIPPED | OUTPATIENT
Start: 2021-04-02 | End: 2022-04-07 | Stop reason: SDUPTHER

## 2022-04-07 ENCOUNTER — HOSPITAL ENCOUNTER (OUTPATIENT)
Dept: GENERAL RADIOLOGY | Facility: HOSPITAL | Age: 67
Discharge: HOME | End: 2022-04-07

## 2022-04-07 ENCOUNTER — HOSPITAL ENCOUNTER (OUTPATIENT)
Dept: CARDIOLOGY | Facility: HOSPITAL | Age: 67
Discharge: HOME | End: 2022-04-07

## 2022-04-07 ENCOUNTER — OFFICE VISIT (OUTPATIENT)
Dept: FAMILY MEDICINE CLINIC | Facility: CLINIC | Age: 67
End: 2022-04-07

## 2022-04-07 ENCOUNTER — LAB (OUTPATIENT)
Dept: LAB | Facility: HOSPITAL | Age: 67
End: 2022-04-07

## 2022-04-07 VITALS
TEMPERATURE: 96.6 F | OXYGEN SATURATION: 90 % | HEART RATE: 106 BPM | WEIGHT: 205 LBS | BODY MASS INDEX: 36.32 KG/M2 | HEIGHT: 63 IN | DIASTOLIC BLOOD PRESSURE: 90 MMHG | SYSTOLIC BLOOD PRESSURE: 170 MMHG

## 2022-04-07 DIAGNOSIS — R06.00 DYSPNEA, UNSPECIFIED TYPE: ICD-10-CM

## 2022-04-07 DIAGNOSIS — J44.9 CHRONIC OBSTRUCTIVE PULMONARY DISEASE, UNSPECIFIED COPD TYPE: Chronic | ICD-10-CM

## 2022-04-07 DIAGNOSIS — E66.01 CLASS 2 SEVERE OBESITY DUE TO EXCESS CALORIES WITH SERIOUS COMORBIDITY AND BODY MASS INDEX (BMI) OF 36.0 TO 36.9 IN ADULT: ICD-10-CM

## 2022-04-07 DIAGNOSIS — I15.9 SECONDARY HYPERTENSION: Primary | ICD-10-CM

## 2022-04-07 PROBLEM — J22 ACUTE RESPIRATORY INFECTION: Status: RESOLVED | Noted: 2020-01-13 | Resolved: 2022-04-07

## 2022-04-07 LAB
ALBUMIN SERPL-MCNC: 4.2 G/DL (ref 3.5–5.2)
ALBUMIN/GLOB SERPL: 1.2 G/DL
ALP SERPL-CCNC: 113 U/L (ref 39–117)
ALT SERPL W P-5'-P-CCNC: 17 U/L (ref 1–33)
ANION GAP SERPL CALCULATED.3IONS-SCNC: 9.3 MMOL/L (ref 5–15)
AST SERPL-CCNC: 16 U/L (ref 1–32)
BASOPHILS # BLD AUTO: 0.1 10*3/MM3 (ref 0–0.2)
BASOPHILS NFR BLD AUTO: 0.9 % (ref 0–1.5)
BILIRUB SERPL-MCNC: 0.4 MG/DL (ref 0–1.2)
BUN SERPL-MCNC: 14 MG/DL (ref 8–23)
BUN/CREAT SERPL: 14.3 (ref 7–25)
CALCIUM SPEC-SCNC: 9.5 MG/DL (ref 8.6–10.5)
CHLORIDE SERPL-SCNC: 100 MMOL/L (ref 98–107)
CHOLEST SERPL-MCNC: 171 MG/DL (ref 0–200)
CO2 SERPL-SCNC: 30.7 MMOL/L (ref 22–29)
CREAT SERPL-MCNC: 0.98 MG/DL (ref 0.57–1)
DEPRECATED RDW RBC AUTO: 41.1 FL (ref 37–54)
EGFRCR SERPLBLD CKD-EPI 2021: 63.4 ML/MIN/1.73
EOSINOPHIL # BLD AUTO: 0.21 10*3/MM3 (ref 0–0.4)
EOSINOPHIL NFR BLD AUTO: 1.8 % (ref 0.3–6.2)
ERYTHROCYTE [DISTWIDTH] IN BLOOD BY AUTOMATED COUNT: 13.9 % (ref 12.3–15.4)
GLOBULIN UR ELPH-MCNC: 3.6 GM/DL
GLUCOSE SERPL-MCNC: 159 MG/DL (ref 65–99)
HCT VFR BLD AUTO: 49.5 % (ref 34–46.6)
HDLC SERPL-MCNC: 40 MG/DL (ref 40–60)
HGB BLD-MCNC: 15.9 G/DL (ref 12–15.9)
IMM GRANULOCYTES # BLD AUTO: 0.04 10*3/MM3 (ref 0–0.05)
IMM GRANULOCYTES NFR BLD AUTO: 0.3 % (ref 0–0.5)
LDLC SERPL CALC-MCNC: 104 MG/DL (ref 0–100)
LDLC/HDLC SERPL: 2.51 {RATIO}
LYMPHOCYTES # BLD AUTO: 1.67 10*3/MM3 (ref 0.7–3.1)
LYMPHOCYTES NFR BLD AUTO: 14.4 % (ref 19.6–45.3)
MCH RBC QN AUTO: 26.1 PG (ref 26.6–33)
MCHC RBC AUTO-ENTMCNC: 32.1 G/DL (ref 31.5–35.7)
MCV RBC AUTO: 81.1 FL (ref 79–97)
MONOCYTES # BLD AUTO: 0.77 10*3/MM3 (ref 0.1–0.9)
MONOCYTES NFR BLD AUTO: 6.6 % (ref 5–12)
NEUTROPHILS NFR BLD AUTO: 76 % (ref 42.7–76)
NEUTROPHILS NFR BLD AUTO: 8.8 10*3/MM3 (ref 1.7–7)
NRBC BLD AUTO-RTO: 0 /100 WBC (ref 0–0.2)
PLATELET # BLD AUTO: 330 10*3/MM3 (ref 140–450)
PMV BLD AUTO: 9.5 FL (ref 6–12)
POTASSIUM SERPL-SCNC: 4.3 MMOL/L (ref 3.5–5.2)
PROT SERPL-MCNC: 7.8 G/DL (ref 6–8.5)
RBC # BLD AUTO: 6.1 10*6/MM3 (ref 3.77–5.28)
SODIUM SERPL-SCNC: 140 MMOL/L (ref 136–145)
T4 FREE SERPL-MCNC: 1.23 NG/DL (ref 0.93–1.7)
TRIGL SERPL-MCNC: 153 MG/DL (ref 0–150)
TROPONIN T SERPL-MCNC: <0.01 NG/ML (ref 0–0.03)
TSH SERPL DL<=0.05 MIU/L-ACNC: 3.61 UIU/ML (ref 0.27–4.2)
VLDLC SERPL-MCNC: 27 MG/DL (ref 5–40)
WBC NRBC COR # BLD: 11.59 10*3/MM3 (ref 3.4–10.8)

## 2022-04-07 PROCEDURE — 84443 ASSAY THYROID STIM HORMONE: CPT

## 2022-04-07 PROCEDURE — 84439 ASSAY OF FREE THYROXINE: CPT

## 2022-04-07 PROCEDURE — 93010 ELECTROCARDIOGRAM REPORT: CPT | Performed by: INTERNAL MEDICINE

## 2022-04-07 PROCEDURE — 99214 OFFICE O/P EST MOD 30 MIN: CPT | Performed by: NURSE PRACTITIONER

## 2022-04-07 PROCEDURE — 80053 COMPREHEN METABOLIC PANEL: CPT

## 2022-04-07 PROCEDURE — 71046 X-RAY EXAM CHEST 2 VIEWS: CPT

## 2022-04-07 PROCEDURE — 80061 LIPID PANEL: CPT

## 2022-04-07 PROCEDURE — 93005 ELECTROCARDIOGRAM TRACING: CPT | Performed by: NURSE PRACTITIONER

## 2022-04-07 PROCEDURE — 36415 COLL VENOUS BLD VENIPUNCTURE: CPT | Performed by: NURSE PRACTITIONER

## 2022-04-07 PROCEDURE — 84484 ASSAY OF TROPONIN QUANT: CPT

## 2022-04-07 PROCEDURE — 85025 COMPLETE CBC W/AUTO DIFF WBC: CPT

## 2022-04-07 RX ORDER — ALBUTEROL SULFATE 90 UG/1
2 AEROSOL, METERED RESPIRATORY (INHALATION) EVERY 4 HOURS PRN
Qty: 18 G | Refills: 3 | Status: SHIPPED | OUTPATIENT
Start: 2022-04-07 | End: 2022-09-09 | Stop reason: SDUPTHER

## 2022-04-07 RX ORDER — LISINOPRIL 10 MG/1
10 TABLET ORAL DAILY
Qty: 30 TABLET | Refills: 0 | Status: SHIPPED | OUTPATIENT
Start: 2022-04-07 | End: 2022-04-28 | Stop reason: SDUPTHER

## 2022-04-07 NOTE — PATIENT INSTRUCTIONS
Monitor blood pressure daily  Start lisinopril   If you have chest pain,, short of air nausea and get sweaty go to the hosptial call 911.   You will get chest xray, labs and ekg today.

## 2022-04-07 NOTE — PROGRESS NOTES
"Subjective        Quynh Du is a 66 y.o. female.     Chief Complaint   Patient presents with   • COPD     Medication refill       History of Present Illness  Patient is here for management of her chronic medical problems : COPD and shortness of breath with exertion.    COPD: using albuterol at least 2-3 times a day for few weeks.     Shortness of breath sat. She became diaphoretic and was holding child handed off child, became nauseous. No other pain. She said lasted less than 10 min. Did not go to eD for evaluation.   This is first time has happened.   She said just gets out of breath when she walks a lot.      The following portions of the patient's history were reviewed and updated as appropriate: allergies, current medications, past family history, past medical history, past social history, past surgical history and problem list.      Current Outpatient Medications:   •  albuterol sulfate  (90 Base) MCG/ACT inhaler, Inhale 2 puffs Every 4 (Four) Hours As Needed for Wheezing., Disp: 18 g, Rfl: 3  •  lisinopril (PRINIVIL,ZESTRIL) 10 MG tablet, Take 1 tablet by mouth Daily., Disp: 30 tablet, Rfl: 0    No results found for this or any previous visit (from the past 4032 hour(s)).      Review of Systems   Respiratory: Positive for shortness of breath and wheezing.        Objective     /90   Pulse 106   Temp 96.6 °F (35.9 °C) (Infrared)   Ht 160 cm (63\")   Wt 93 kg (205 lb)   SpO2 90%   BMI 36.31 kg/m²     Physical Exam  Vitals reviewed.   Constitutional:       Appearance: She is obese.   HENT:      Head: Normocephalic.      Right Ear: Tympanic membrane and external ear normal.      Left Ear: Tympanic membrane and external ear normal.      Nose: Nose normal.      Mouth/Throat:      Mouth: Mucous membranes are moist.   Eyes:      Pupils: Pupils are equal, round, and reactive to light.   Cardiovascular:      Rate and Rhythm: Normal rate and regular rhythm.      Pulses: Normal pulses.      Heart " sounds: Normal heart sounds.   Pulmonary:      Effort: Pulmonary effort is normal.      Breath sounds: Normal breath sounds.   Abdominal:      Palpations: Abdomen is soft.   Musculoskeletal:         General: Normal range of motion.   Skin:     General: Skin is warm and dry.   Neurological:      General: No focal deficit present.      Mental Status: She is alert and oriented to person, place, and time.   Psychiatric:         Mood and Affect: Mood normal.         Behavior: Behavior normal.         Thought Content: Thought content normal.         Judgment: Judgment normal.         Result Review :                Assessment/Plan    Diagnoses and all orders for this visit:    1. Secondary hypertension (Primary)  Comments:  start lisinopril     2. Chronic obstructive pulmonary disease, unspecified COPD type ([facility])  Comments:  stable    3. Dyspnea, unspecified type  Comments:  testing and labs ordered symptoms of cardiac with this recent event. short of air with exertion, nausea and painwhen raised arms.    Orders:  -     CBC & Differential; Future  -     Comprehensive Metabolic Panel; Future  -     TSH; Future  -     T4, free; Future  -     ECG 12 Lead; Future  -     XR Chest 2 View; Future  -     Stress Test With Myocardial Perfusion - One Day; Future  -     Adult Transthoracic Echo Limited W/ Cont if Necessary Per Protocol; Future  -     Troponin T  -     Lipid Panel; Future    4. Class 2 severe obesity due to excess calories with serious comorbidity and body mass index (BMI) of 36.0 to 36.9 in adult ([facility])  Comments:  discussed eating healty  Orders:  -     Lipid Panel; Future    Other orders  -     albuterol sulfate  (90 Base) MCG/ACT inhaler; Inhale 2 puffs Every 4 (Four) Hours As Needed for Wheezing.  Dispense: 18 g; Refill: 3  -     lisinopril (PRINIVIL,ZESTRIL) 10 MG tablet; Take 1 tablet by mouth Daily.  Dispense: 30 tablet; Refill: 0      Patient Instructions   Monitor blood pressure daily  Start lisinopril    If you have chest pain,, short of air nausea and get sweaty go to the hosptial call 911.   You will get chest xray, labs and ekg today.        Follow Up   Return in about 2 weeks (around 4/21/2022).    Patient was given instructions and counseling regarding her condition or for health maintenance advice. Please see specific information pulled into the AVS if appropriate.     Cheryl Perez, ALYSON    04/07/22      Answers for HPI/ROS submitted by the patient on 4/6/2022  What is the primary reason for your visit?: Shortness of Breath

## 2022-04-11 LAB — QT INTERVAL: 345 MS

## 2022-04-13 DIAGNOSIS — R94.31 ABNORMAL EKG: Primary | ICD-10-CM

## 2022-04-13 DIAGNOSIS — I15.9 SECONDARY HYPERTENSION: ICD-10-CM

## 2022-04-13 DIAGNOSIS — R06.00 DYSPNEA, UNSPECIFIED TYPE: ICD-10-CM

## 2022-04-27 ENCOUNTER — HOSPITAL ENCOUNTER (OUTPATIENT)
Dept: CARDIOLOGY | Facility: HOSPITAL | Age: 67
Discharge: HOME | End: 2022-04-27

## 2022-04-27 ENCOUNTER — HOSPITAL ENCOUNTER (OUTPATIENT)
Dept: NUCLEAR MEDICINE | Facility: HOSPITAL | Age: 67
Discharge: HOME | End: 2022-04-27

## 2022-04-27 VITALS
HEIGHT: 63 IN | DIASTOLIC BLOOD PRESSURE: 83 MMHG | BODY MASS INDEX: 36.32 KG/M2 | WEIGHT: 205 LBS | SYSTOLIC BLOOD PRESSURE: 165 MMHG

## 2022-04-27 DIAGNOSIS — R06.00 DYSPNEA, UNSPECIFIED TYPE: ICD-10-CM

## 2022-04-27 LAB
BH CV ECHO MEAS - AO MAX PG: 8.2 MMHG
BH CV ECHO MEAS - AO MEAN PG: 3.9 MMHG
BH CV ECHO MEAS - AO ROOT DIAM: 3 CM
BH CV ECHO MEAS - AO V2 MAX: 143.5 CM/SEC
BH CV ECHO MEAS - AO V2 VTI: 25.9 CM
BH CV ECHO MEAS - AVA(I,D): 3.5 CM2
BH CV ECHO MEAS - EDV(CUBED): 105.8 ML
BH CV ECHO MEAS - EDV(MOD-SP4): 87.3 ML
BH CV ECHO MEAS - EF(MOD-BP): 65 %
BH CV ECHO MEAS - EF(MOD-SP4): 65 %
BH CV ECHO MEAS - ESV(CUBED): 28.5 ML
BH CV ECHO MEAS - ESV(MOD-SP4): 30.6 ML
BH CV ECHO MEAS - FS: 35.4 %
BH CV ECHO MEAS - IVS/LVPW: 0.95 CM
BH CV ECHO MEAS - IVSD: 1.17 CM
BH CV ECHO MEAS - LA DIMENSION(2D): 3.8 CM
BH CV ECHO MEAS - LV MASS(C)D: 213.9 GRAMS
BH CV ECHO MEAS - LV MAX PG: 7.5 MMHG
BH CV ECHO MEAS - LV MEAN PG: 3.4 MMHG
BH CV ECHO MEAS - LV V1 MAX: 137.2 CM/SEC
BH CV ECHO MEAS - LV V1 VTI: 25.4 CM
BH CV ECHO MEAS - LVIDD: 4.7 CM
BH CV ECHO MEAS - LVIDS: 3.1 CM
BH CV ECHO MEAS - LVOT AREA: 3.6 CM2
BH CV ECHO MEAS - LVOT DIAM: 2.13 CM
BH CV ECHO MEAS - LVPWD: 1.23 CM
BH CV ECHO MEAS - MV A MAX VEL: 103.5 CM/SEC
BH CV ECHO MEAS - MV DEC SLOPE: 429.5 CM/SEC2
BH CV ECHO MEAS - MV DEC TIME: 0.18 MSEC
BH CV ECHO MEAS - MV E MAX VEL: 77.7 CM/SEC
BH CV ECHO MEAS - MV E/A: 0.75
BH CV ECHO MEAS - MV MAX PG: 5.7 MMHG
BH CV ECHO MEAS - MV MEAN PG: 2.5 MMHG
BH CV ECHO MEAS - MV V2 VTI: 24.4 CM
BH CV ECHO MEAS - MVA(VTI): 3.7 CM2
BH CV ECHO MEAS - PA ACC TIME: 0.07 SEC
BH CV ECHO MEAS - PA PR(ACCEL): 48.2 MMHG
BH CV ECHO MEAS - PA V2 MAX: 105.3 CM/SEC
BH CV ECHO MEAS - PULM A REVS DUR: 0.11 SEC
BH CV ECHO MEAS - PULM A REVS VEL: 40.2 CM/SEC
BH CV ECHO MEAS - PULM DIAS VEL: 58.2 CM/SEC
BH CV ECHO MEAS - PULM SYS VEL: 67.8 CM/SEC
BH CV ECHO MEAS - RAP SYSTOLE: 3 MMHG
BH CV ECHO MEAS - RV MAX PG: 3.7 MMHG
BH CV ECHO MEAS - RV V1 MAX: 95.6 CM/SEC
BH CV ECHO MEAS - RV V1 VTI: 21 CM
BH CV ECHO MEAS - RVDD: 1.78 CM
BH CV ECHO MEAS - RVOT DIAM: 2.8 CM
BH CV ECHO MEAS - RVSP: 14.1 MMHG
BH CV ECHO MEAS - SV(LVOT): 90.3 ML
BH CV ECHO MEAS - SV(MOD-SP4): 56.7 ML
BH CV ECHO MEAS - SV(RVOT): 131.5 ML
BH CV ECHO MEAS - TR MAX PG: 11.1 MMHG
BH CV ECHO MEAS - TR MAX VEL: 166.9 CM/SEC
BH CV REST NUCLEAR ISOTOPE DOSE: 7.5 MCI
BH CV STRESS BP STAGE 1: NORMAL
BH CV STRESS COMMENTS STAGE 1: NORMAL
BH CV STRESS DOSE REGADENOSON STAGE 1: 0.4
BH CV STRESS DURATION MIN STAGE 1: 0
BH CV STRESS DURATION SEC STAGE 1: 10
BH CV STRESS HR STAGE 1: 106
BH CV STRESS NUCLEAR ISOTOPE DOSE: 21.9 MCI
BH CV STRESS PROTOCOL 1: NORMAL
BH CV STRESS RECOVERY BP: NORMAL MMHG
BH CV STRESS RECOVERY HR: 94 BPM
BH CV STRESS STAGE 1: 1
LV EF 2D ECHO EST: 65 %
LV EF NUC BP: 70 %
MAXIMAL PREDICTED HEART RATE: 153 BPM
MAXIMAL PREDICTED HEART RATE: 153 BPM
PERCENT MAX PREDICTED HR: 69.28 %
STRESS BASELINE BP: NORMAL MMHG
STRESS BASELINE HR: 93 BPM
STRESS PERCENT HR: 82 %
STRESS POST PEAK BP: NORMAL MMHG
STRESS POST PEAK HR: 106 BPM
STRESS TARGET HR: 130 BPM
STRESS TARGET HR: 130 BPM

## 2022-04-27 PROCEDURE — 0 TECHNETIUM TETROFOSMIN KIT: Performed by: NURSE PRACTITIONER

## 2022-04-27 PROCEDURE — 93306 TTE W/DOPPLER COMPLETE: CPT

## 2022-04-27 PROCEDURE — 93306 TTE W/DOPPLER COMPLETE: CPT | Performed by: INTERNAL MEDICINE

## 2022-04-27 PROCEDURE — A9502 TC99M TETROFOSMIN: HCPCS | Performed by: NURSE PRACTITIONER

## 2022-04-27 PROCEDURE — 93017 CV STRESS TEST TRACING ONLY: CPT

## 2022-04-27 PROCEDURE — 93016 CV STRESS TEST SUPVJ ONLY: CPT | Performed by: INTERNAL MEDICINE

## 2022-04-27 PROCEDURE — 78452 HT MUSCLE IMAGE SPECT MULT: CPT

## 2022-04-27 PROCEDURE — 93018 CV STRESS TEST I&R ONLY: CPT | Performed by: INTERNAL MEDICINE

## 2022-04-27 PROCEDURE — 78452 HT MUSCLE IMAGE SPECT MULT: CPT | Performed by: INTERNAL MEDICINE

## 2022-04-27 PROCEDURE — 25010000002 REGADENOSON 0.4 MG/5ML SOLUTION: Performed by: NURSE PRACTITIONER

## 2022-04-27 RX ADMIN — TETROFOSMIN 1 DOSE: 1.38 INJECTION, POWDER, LYOPHILIZED, FOR SOLUTION INTRAVENOUS at 09:06

## 2022-04-27 RX ADMIN — REGADENOSON 0.4 MG: 0.08 INJECTION, SOLUTION INTRAVENOUS at 10:15

## 2022-04-27 RX ADMIN — TETROFOSMIN 1 DOSE: 1.38 INJECTION, POWDER, LYOPHILIZED, FOR SOLUTION INTRAVENOUS at 10:15

## 2022-04-28 RX ORDER — LISINOPRIL 10 MG/1
10 TABLET ORAL DAILY
Qty: 90 TABLET | Refills: 1 | Status: SHIPPED | OUTPATIENT
Start: 2022-04-28 | End: 2022-06-10 | Stop reason: SDUPTHER

## 2022-05-09 ENCOUNTER — OFFICE VISIT (OUTPATIENT)
Dept: FAMILY MEDICINE CLINIC | Facility: CLINIC | Age: 67
End: 2022-05-09

## 2022-05-09 VITALS
OXYGEN SATURATION: 93 % | HEIGHT: 63 IN | DIASTOLIC BLOOD PRESSURE: 86 MMHG | WEIGHT: 205 LBS | BODY MASS INDEX: 36.32 KG/M2 | TEMPERATURE: 96.8 F | HEART RATE: 96 BPM | SYSTOLIC BLOOD PRESSURE: 156 MMHG

## 2022-05-09 DIAGNOSIS — I25.9 ISCHEMIA OF HEART, CHRONIC: Primary | ICD-10-CM

## 2022-05-09 DIAGNOSIS — I15.9 SECONDARY HYPERTENSION: Chronic | ICD-10-CM

## 2022-05-09 DIAGNOSIS — Z00.00 MEDICARE ANNUAL WELLNESS VISIT, SUBSEQUENT: ICD-10-CM

## 2022-05-09 DIAGNOSIS — R06.00 DYSPNEA, UNSPECIFIED TYPE: ICD-10-CM

## 2022-05-09 PROBLEM — E78.2 MIXED HYPERLIPIDEMIA: Status: ACTIVE | Noted: 2022-05-09

## 2022-05-09 PROCEDURE — 99214 OFFICE O/P EST MOD 30 MIN: CPT | Performed by: NURSE PRACTITIONER

## 2022-05-09 PROCEDURE — G0439 PPPS, SUBSEQ VISIT: HCPCS | Performed by: NURSE PRACTITIONER

## 2022-05-09 RX ORDER — ASPIRIN 81 MG/1
81 TABLET ORAL DAILY
Qty: 90 TABLET | Refills: 1 | Status: SHIPPED | OUTPATIENT
Start: 2022-05-09 | End: 2022-06-10 | Stop reason: SDUPTHER

## 2022-05-09 RX ORDER — ATORVASTATIN CALCIUM 20 MG/1
20 TABLET, FILM COATED ORAL DAILY
Qty: 30 TABLET | Refills: 0 | Status: SHIPPED | OUTPATIENT
Start: 2022-05-09 | End: 2022-06-09 | Stop reason: SDUPTHER

## 2022-05-09 NOTE — PROGRESS NOTES
The ABCs of the Annual Wellness Visit  Subsequent Medicare Wellness Visit    Chief Complaint   Patient presents with   • Medicare Wellness-subsequent     Sub medicare wellness   • Hypertension     2 week f/u      Subjective    History of Present Illness:  Quynh Du is a 66 y.o. female who presents for a Subsequent Medicare Wellness Visit.    Patient is here for management of her hypertension:   113/80 at home she is checking . She is feeling more fatigue.taking lisinopril 10 mg once day. She was having shortness of breath and completed her cardiac workup.  She reports she has upcoming appointment with cardiology.   Her stress test was abnormal . Myocardial perfusion shows moderate size severe intensity reversible ischemia involving the inferolateral wall.   Echo : left ventricular wall thickness is consistent with mild concentric hypertrophy. EF 65% left ventricular diastolic function consistent with grade 1 impaired relaxation. Estimated right ventricular systolic pressure from tricuspid <35.   Cardiac risk factor assessment is 28% will start aspirin and atorvastatin.            The following portions of the patient's history were reviewed and   updated as appropriate: allergies, current medications, past family history, past medical history, past social history, past surgical history and problem list.    Compared to one year ago, the patient feels her physical   health is the same.    Compared to one year ago, the patient feels her mental   health is the same.    Recent Hospitalizations:  She was not admitted to the hospital during the last year.       Current Medical Providers:  Patient Care Team:  Cheryl Perez APRN as PCP - General (Nurse Practitioner)  Lalo Coleman MD as Consulting Physician (Cardiology)    Outpatient Medications Prior to Visit   Medication Sig Dispense Refill   • albuterol sulfate  (90 Base) MCG/ACT inhaler Inhale 2 puffs Every 4 (Four) Hours As Needed for Wheezing. 18 g 3    • lisinopril (PRINIVIL,ZESTRIL) 10 MG tablet Take 1 tablet by mouth Daily. 90 tablet 1     No facility-administered medications prior to visit.       No opioid medication identified on active medication list. I have reviewed chart for other potential  high risk medication/s and harmful drug interactions in the elderly.          Aspirin is not on active medication list.  Aspirin use is indicated based on review of current medical condition/s. Pros and cons of this therapy have been discussed with this patient. Benefits of this medication outweigh potential harm.  Patient has been instructed to start taking this medication..    Patient Active Problem List   Diagnosis   • Chronic obstructive pulmonary disease (HCC)   • Depression   • Hematuria   • Obesity   • Medicare annual wellness visit, subsequent   • Dyspnea   • Secondary hypertension   • Mixed hyperlipidemia   • Ischemia of heart, chronic     Advance Care Planning  Advance Directive is not on file.  ACP discussion was held with the patient during this visit. Patient does not have an advance directive, information provided.    Review of Systems   Constitutional: Negative for fever.   HENT: Negative for dental problem, postnasal drip and rhinorrhea.         No dental concerns.   Followed by dentist   Eyes:        Last eye exam 6-7 years no glasses .  Not colorblind not blind   Respiratory:        No tB exposure   Cardiovascular: Negative for chest pain, palpitations and leg swelling.   Endocrine: Negative for cold intolerance, heat intolerance, polydipsia and polyphagia.   Genitourinary: Negative for decreased urine volume, dyspareunia, dysuria, menstrual problem, pelvic pain and vaginal bleeding.   Skin:        No  Skin rashes no skin cancer. She is followed by dermatology has a lot of moles removed   Neurological: Negative for tremors and seizures.   Hematological: Negative.    Psychiatric/Behavioral: Negative for decreased concentration, dysphoric mood and  "hallucinations. The patient is not nervous/anxious.         Objective    Vitals:    05/09/22 0944 05/09/22 1024   BP: 174/98 156/86   BP Location: Left arm    Patient Position: Sitting    Cuff Size: Adult    Pulse: 96    Temp: 96.8 °F (36 °C)    TempSrc: Infrared    SpO2: 93%    Weight: 93 kg (205 lb)    Height: 160 cm (63\")    PainSc: 0-No pain      BMI Readings from Last 1 Encounters:   05/09/22 36.31 kg/m²   BMI is above normal parameters. Recommendations include: educational material    Does the patient have evidence of cognitive impairment? No    Physical Exam  Vitals and nursing note reviewed.   Constitutional:       Appearance: She is obese.   HENT:      Head: Normocephalic.      Right Ear: External ear normal.      Left Ear: External ear normal.      Nose: Nose normal.      Mouth/Throat:      Mouth: Mucous membranes are moist.   Eyes:      Conjunctiva/sclera: Conjunctivae normal.      Pupils: Pupils are equal, round, and reactive to light.   Cardiovascular:      Rate and Rhythm: Normal rate and regular rhythm.      Pulses: Normal pulses.      Heart sounds: Normal heart sounds.   Pulmonary:      Effort: Pulmonary effort is normal.   Abdominal:      General: Bowel sounds are normal.      Palpations: Abdomen is soft.   Musculoskeletal:         General: Normal range of motion.      Cervical back: Normal range of motion.   Skin:     General: Skin is warm and dry.      Capillary Refill: Capillary refill takes less than 2 seconds.   Neurological:      General: No focal deficit present.      Mental Status: She is alert and oriented to person, place, and time.   Psychiatric:         Mood and Affect: Mood normal.         Behavior: Behavior normal.         Thought Content: Thought content normal.         Judgment: Judgment normal.       Lab Results   Component Value Date    TRIG 153 (H) 04/07/2022    HDL 40 04/07/2022     (H) 04/07/2022    VLDL 27 04/07/2022            HEALTH RISK ASSESSMENT    Smoking " Status:  Social History     Tobacco Use   Smoking Status Former Smoker   • Packs/day: 0.50   • Years: 15.00   • Pack years: 7.50   • Types: Cigarettes   • Quit date: 2009   • Years since quittin.7   Smokeless Tobacco Never Used     Alcohol Consumption:  Social History     Substance and Sexual Activity   Alcohol Use Not Currently    Comment: socially     Fall Risk Screen:    KEMADI Fall Risk Assessment was completed, and patient is at LOW risk for falls.Assessment completed on:2022    Depression Screening:  PHQ-2/PHQ-9 Depression Screening 2022   Retired Total Score -   Little Interest or Pleasure in Doing Things 0-->not at all   Feeling Down, Depressed or Hopeless 0-->not at all   PHQ-9: Brief Depression Severity Measure Score 0       Health Habits and Functional and Cognitive Screening:  Functional & Cognitive Status 2022   Do you have difficulty preparing food and eating? No   Do you have difficulty bathing yourself, getting dressed or grooming yourself? No   Do you have difficulty using the toilet? No   Do you have difficulty moving around from place to place? No   Do you have trouble with steps or getting out of a bed or a chair? No   Current Diet Well Balanced Diet   Dental Exam Not up to date   Eye Exam Not up to date   Exercise (times per week) 0 times per week   Current Exercises Include No Regular Exercise   Current Exercise Activities Include -   Do you need help using the phone?  No   Are you deaf or do you have serious difficulty hearing?  No   Do you need help with transportation? No   Do you need help shopping? No   Do you need help preparing meals?  No   Do you need help with housework?  No   Do you need help with laundry? No   Do you need help taking your medications? No   Do you need help managing money? No   Do you ever drive or ride in a car without wearing a seat belt? No   Have you felt unusual stress, anger or loneliness in the last month? No   Who do you live with? Alone    If you need help, do you have trouble finding someone available to you? No   Have you been bothered in the last four weeks by sexual problems? No   Do you have difficulty concentrating, remembering or making decisions? No       Age-appropriate Screening Schedule:  Refer to the list below for future screening recommendations based on patient's age, sex and/or medical conditions. Orders for these recommended tests are listed in the plan section. The patient has been provided with a written plan.    Health Maintenance   Topic Date Due   • DXA SCAN  Never done   • TDAP/TD VACCINES (1 - Tdap) Never done   • ZOSTER VACCINE (1 of 2) Never done   • MAMMOGRAM  05/09/2023 (Originally 1955)   • INFLUENZA VACCINE  08/01/2022   • LIPID PANEL  04/07/2023              Assessment/Plan   CMS Preventative Services Quick Reference  Risk Factors Identified During Encounter  Cardiovascular Disease  The above risks/problems have been discussed with the patient.  Follow up actions/plans if indicated are seen below in the Assessment/Plan Section.  Pertinent information has been shared with the patient in the After Visit Summary.    Diagnoses and all orders for this visit:    1. Ischemia of heart, chronic (Primary)  Comments:  start statin, has appointment with cardiology for 5/25. start baby aspirin    2. Secondary hypertension  Comments:  stable at home she will monitor    3. Dyspnea, unspecified type  Comments:  resolved. found to have moderate size severe intensity reversible ischemia inlovling inferolateral wall.     4. Medicare annual wellness visit, subsequent    Other orders  -     aspirin (aspirin) 81 MG EC tablet; Take 1 tablet by mouth Daily.  Dispense: 90 tablet; Refill: 1  -     atorvastatin (LIPITOR) 20 MG tablet; Take 1 tablet by mouth Daily.  Dispense: 30 tablet; Refill: 0        Follow Up:   Return in about 1 month (around 6/9/2022).     An After Visit Summary and PPPS were made available to the  patient.    {Optional Chart Navigation Links Wrapup  Review (Popup)  Advance Care Planning  Labs  CC  Problem List  Visit Diagnosis  Medications  Result Review  Imaging  Health Maintenance  Quality  BestPractice  SmartSets  SnapShot  Encounters  Notes  Media  Procedures :23}               Answers for HPI/ROS submitted by the patient on 5/3/2022  Please describe your symptoms.: This is a follow up visit requested by Cheryl  Have you had these symptoms before?: Yes  How long have you been having these symptoms?: 1-2 weeks  Please list any medications you are currently taking for this condition.: Lisinopril  What is the primary reason for your visit?: Other

## 2022-05-09 NOTE — PATIENT INSTRUCTIONS
Get us copy of advanced care directive.  Take baby aspirin 81 mg daily  Take cholesterol medication at night.   Eat a cardiac .   More plant based diet.   Limit alcohol  If short of air or difficulty breathing or chest discomfort go to the hospital immediately.

## 2022-05-09 NOTE — PROGRESS NOTES
Subjective        Quynh Du is a 66 y.o. female.     Chief Complaint   Patient presents with   • Medicare Wellness-subsequent     Sub medicare wellness   • Hypertension     2 week f/u       History of Present Illness    The following portions of the patient's history were reviewed and updated as appropriate: allergies, current medications, past family history, past medical history, past social history, past surgical history and problem list.      Current Outpatient Medications:   •  albuterol sulfate  (90 Base) MCG/ACT inhaler, Inhale 2 puffs Every 4 (Four) Hours As Needed for Wheezing., Disp: 18 g, Rfl: 3  •  lisinopril (PRINIVIL,ZESTRIL) 10 MG tablet, Take 1 tablet by mouth Daily., Disp: 90 tablet, Rfl: 1    Recent Results (from the past 4032 hour(s))   Comprehensive Metabolic Panel    Collection Time: 04/07/22 11:49 AM    Specimen: Blood   Result Value Ref Range    Glucose 159 (H) 65 - 99 mg/dL    BUN 14 8 - 23 mg/dL    Creatinine 0.98 0.57 - 1.00 mg/dL    Sodium 140 136 - 145 mmol/L    Potassium 4.3 3.5 - 5.2 mmol/L    Chloride 100 98 - 107 mmol/L    CO2 30.7 (H) 22.0 - 29.0 mmol/L    Calcium 9.5 8.6 - 10.5 mg/dL    Total Protein 7.8 6.0 - 8.5 g/dL    Albumin 4.20 3.50 - 5.20 g/dL    ALT (SGPT) 17 1 - 33 U/L    AST (SGOT) 16 1 - 32 U/L    Alkaline Phosphatase 113 39 - 117 U/L    Total Bilirubin 0.4 0.0 - 1.2 mg/dL    Globulin 3.6 gm/dL    A/G Ratio 1.2 g/dL    BUN/Creatinine Ratio 14.3 7.0 - 25.0    Anion Gap 9.3 5.0 - 15.0 mmol/L    eGFR 63.4 >60.0 mL/min/1.73   Lipid Panel    Collection Time: 04/07/22 11:49 AM    Specimen: Blood   Result Value Ref Range    Total Cholesterol 171 0 - 200 mg/dL    Triglycerides 153 (H) 0 - 150 mg/dL    HDL Cholesterol 40 40 - 60 mg/dL    LDL Cholesterol  104 (H) 0 - 100 mg/dL    VLDL Cholesterol 27 5 - 40 mg/dL    LDL/HDL Ratio 2.51    T4, free    Collection Time: 04/07/22 11:49 AM    Specimen: Blood   Result Value Ref Range    Free T4 1.23 0.93 - 1.70 ng/dL   TSH     Collection Time: 04/07/22 11:49 AM    Specimen: Blood   Result Value Ref Range    TSH 3.610 0.270 - 4.200 uIU/mL   CBC Auto Differential    Collection Time: 04/07/22 11:49 AM    Specimen: Blood   Result Value Ref Range    WBC 11.59 (H) 3.40 - 10.80 10*3/mm3    RBC 6.10 (H) 3.77 - 5.28 10*6/mm3    Hemoglobin 15.9 12.0 - 15.9 g/dL    Hematocrit 49.5 (H) 34.0 - 46.6 %    MCV 81.1 79.0 - 97.0 fL    MCH 26.1 (L) 26.6 - 33.0 pg    MCHC 32.1 31.5 - 35.7 g/dL    RDW 13.9 12.3 - 15.4 %    RDW-SD 41.1 37.0 - 54.0 fl    MPV 9.5 6.0 - 12.0 fL    Platelets 330 140 - 450 10*3/mm3    Neutrophil % 76.0 42.7 - 76.0 %    Lymphocyte % 14.4 (L) 19.6 - 45.3 %    Monocyte % 6.6 5.0 - 12.0 %    Eosinophil % 1.8 0.3 - 6.2 %    Basophil % 0.9 0.0 - 1.5 %    Immature Grans % 0.3 0.0 - 0.5 %    Neutrophils, Absolute 8.80 (H) 1.70 - 7.00 10*3/mm3    Lymphocytes, Absolute 1.67 0.70 - 3.10 10*3/mm3    Monocytes, Absolute 0.77 0.10 - 0.90 10*3/mm3    Eosinophils, Absolute 0.21 0.00 - 0.40 10*3/mm3    Basophils, Absolute 0.10 0.00 - 0.20 10*3/mm3    Immature Grans, Absolute 0.04 0.00 - 0.05 10*3/mm3    nRBC 0.0 0.0 - 0.2 /100 WBC   TROPONIN (IN-HOUSE)    Collection Time: 04/07/22 11:51 AM    Specimen: Blood   Result Value Ref Range    Troponin T <0.010 0.000 - 0.030 ng/mL   ECG 12 Lead    Collection Time: 04/07/22 12:04 PM   Result Value Ref Range    QT Interval 345 ms   Adult Transthoracic Echo Complete W/ Color, Spectral and Contrast if Necessary Per Protocol    Collection Time: 04/27/22  9:50 AM   Result Value Ref Range    Target HR (85%) 130 bpm    Max. Pred. HR (100%) 153 bpm    Ao root diam 3.0 cm    Ao pk kassandra 143.5 cm/sec    Ao V2 VTI 25.9 cm    SONIDO(I,D) 3.5 cm2    EDV(cubed) 105.8 ml    EDV(MOD-sp4) 87.3 ml    EF(MOD-bp) 65.0 %    EF(MOD-sp4) 65.0 %    ESV(cubed) 28.5 ml    ESV(MOD-sp4) 30.6 ml    IVS/LVPW 0.95 cm    LV mass(C)d 213.9 grams    LV V1 max PG 7.5 mmHg    LV V1 mean PG 3.4 mmHg    LV V1 max 137.2 cm/sec    LVPWd 1.23 cm     "MV dec slope 429.5 cm/sec2    MV dec time 0.18 msec    MV V2 VTI 24.4 cm    MVA(VTI) 3.7 cm2    PA acc time 0.07 sec    PA pr(Accel) 48.2 mmHg    PA V2 max 105.3 cm/sec    Pulm A Revs Celso 40.2 cm/sec    RAP systole 3.0 mmHg    RV V1 max PG 3.7 mmHg    RV V1 max 95.6 cm/sec    RV V1 VTI 21.0 cm    RVIDd 1.78 cm    RVSP(TR) 14.1 mmHg    SV(LVOT) 90.3 ml    SV(MOD-sp4) 56.7 ml    SV(RVOT) 131.5 ml    TR max PG 11.1 mmHg    Ao max PG 8.2 mmHg    Ao mean PG 3.9 mmHg    FS 35.4 %    IVSd 1.17 cm    LA dimension(2D) 3.8 cm    LV V1 VTI 25.4 cm    LVIDd 4.7 cm    LVIDs 3.1 cm    LVOT area 3.6 cm2    LVOT diam 2.13 cm    MV E/A 0.75     MV max PG 5.7 mmHg    MV mean PG 2.50 mmHg    RVOT diam 2.8 cm    MV A max celso 103.5 cm/sec    MV E max celso 77.7 cm/sec    Pulm A Revs Dur 0.11 sec    Pulm Kirby Celso 58.2 cm/sec    Pulm Sys Celso 67.8 cm/sec    TR max celso 166.9 cm/sec    Echo EF Estimated 65 %   Stress Test With Myocardial Perfusion - One Day    Collection Time: 04/27/22 10:26 AM   Result Value Ref Range    Target HR (85%) 130 bpm    Max. Pred. HR (100%) 153 bpm    BH CV STRESS PROTOCOL 1 Pharmacologic     Stage 1 1     HR Stage 1 106     BP Stage 1 195/99     Duration Min Stage 1 0     Duration Sec Stage 1 10     Stress Dose Regadenoson Stage 1 0.4     Stress Comments Stage 1 10 sec bolus injection     Baseline HR 93 bpm    Baseline /98 mmHg    Peak  bpm    Percent Max Pred HR 69.28 %    Percent Target HR 82 %    Peak /99 mmHg    Recovery HR 94 bpm    Recovery /89 mmHg    BH CV REST NUCLEAR ISOTOPE DOSE 7.5 mCi    BH CV STRESS NUCLEAR ISOTOPE DOSE 21.9 mCi    Nuc Stress EF 70 %         Review of Systems    Objective     /98 (BP Location: Left arm, Patient Position: Sitting, Cuff Size: Adult)   Pulse 96   Temp 96.8 °F (36 °C) (Infrared)   Ht 160 cm (63\")   Wt 93 kg (205 lb)   SpO2 93%   BMI 36.31 kg/m²     Physical Exam    Result Review :                Assessment/Plan    There are no " diagnoses linked to this encounter.  There are no Patient Instructions on file for this visit.    Follow Up   No follow-ups on file.    Patient was given instructions and counseling regarding her condition or for health maintenance advice. Please see specific information pulled into the AVS if appropriate.     Cheryl Perez, APRN    05/09/22      Answers for HPI/ROS submitted by the patient on 5/3/2022  Please describe your symptoms.: This is a follow up visit requested by Cheryl  Have you had these symptoms before?: Yes  How long have you been having these symptoms?: 1-2 weeks  Please list any medications you are currently taking for this condition.: Lisinopril  What is the primary reason for your visit?: Other

## 2022-06-09 RX ORDER — ATORVASTATIN CALCIUM 20 MG/1
20 TABLET, FILM COATED ORAL DAILY
Qty: 90 TABLET | Refills: 0 | Status: SHIPPED | OUTPATIENT
Start: 2022-06-09 | End: 2022-06-10 | Stop reason: SDUPTHER

## 2022-06-10 ENCOUNTER — OFFICE VISIT (OUTPATIENT)
Dept: FAMILY MEDICINE CLINIC | Facility: CLINIC | Age: 67
End: 2022-06-10

## 2022-06-10 VITALS
OXYGEN SATURATION: 92 % | DIASTOLIC BLOOD PRESSURE: 84 MMHG | TEMPERATURE: 96.9 F | WEIGHT: 203 LBS | BODY MASS INDEX: 35.97 KG/M2 | SYSTOLIC BLOOD PRESSURE: 140 MMHG | HEART RATE: 92 BPM | HEIGHT: 63 IN

## 2022-06-10 DIAGNOSIS — I15.9 SECONDARY HYPERTENSION: Primary | ICD-10-CM

## 2022-06-10 PROCEDURE — 99213 OFFICE O/P EST LOW 20 MIN: CPT | Performed by: NURSE PRACTITIONER

## 2022-06-10 RX ORDER — ATORVASTATIN CALCIUM 20 MG/1
20 TABLET, FILM COATED ORAL DAILY
Qty: 90 TABLET | Refills: 0 | Status: SHIPPED | OUTPATIENT
Start: 2022-06-10 | End: 2022-12-09 | Stop reason: SDUPTHER

## 2022-06-10 RX ORDER — LISINOPRIL 10 MG/1
10 TABLET ORAL DAILY
Qty: 90 TABLET | Refills: 1 | Status: SHIPPED | OUTPATIENT
Start: 2022-06-10

## 2022-06-10 RX ORDER — ASPIRIN 81 MG/1
81 TABLET ORAL DAILY
Qty: 90 TABLET | Refills: 1 | Status: SHIPPED | OUTPATIENT
Start: 2022-06-10

## 2022-06-10 NOTE — PROGRESS NOTES
Subjective   {CC  Problem List  Visit Diagnosis   Encounters  Notes  Medications  Labs  Result Review Imaging  Media :23}     Quynh Du is a 66 y.o. female.     Chief Complaint   Patient presents with   • Hypertension     1 month f/u       History of Present Illness  Patient is here for management of her hypertension: she is checking at home and has been avg 126/79. Denies she is having chest pain. Not short of air with activity.   She had abnormal stress test and showing ischemia of heart . She is awaiting appointment with cardiology.  Taking lisinopril 10 mg daily she has white coat hypertension as reported.   No changes made she will continue aspirin , statin and lisinopril.      The following portions of the patient's history were reviewed and updated as appropriate: allergies, current medications, past family history, past medical history, past social history, past surgical history and problem list.      Current Outpatient Medications:   •  albuterol sulfate  (90 Base) MCG/ACT inhaler, Inhale 2 puffs Every 4 (Four) Hours As Needed for Wheezing., Disp: 18 g, Rfl: 3  •  aspirin (aspirin) 81 MG EC tablet, Take 1 tablet by mouth Daily., Disp: 90 tablet, Rfl: 1  •  atorvastatin (LIPITOR) 20 MG tablet, Take 1 tablet by mouth Daily., Disp: 90 tablet, Rfl: 0  •  lisinopril (PRINIVIL,ZESTRIL) 10 MG tablet, Take 1 tablet by mouth Daily., Disp: 90 tablet, Rfl: 1    Recent Results (from the past 4032 hour(s))   Comprehensive Metabolic Panel    Collection Time: 04/07/22 11:49 AM    Specimen: Blood   Result Value Ref Range    Glucose 159 (H) 65 - 99 mg/dL    BUN 14 8 - 23 mg/dL    Creatinine 0.98 0.57 - 1.00 mg/dL    Sodium 140 136 - 145 mmol/L    Potassium 4.3 3.5 - 5.2 mmol/L    Chloride 100 98 - 107 mmol/L    CO2 30.7 (H) 22.0 - 29.0 mmol/L    Calcium 9.5 8.6 - 10.5 mg/dL    Total Protein 7.8 6.0 - 8.5 g/dL    Albumin 4.20 3.50 - 5.20 g/dL    ALT (SGPT) 17 1 - 33 U/L    AST (SGOT) 16 1 - 32 U/L     Alkaline Phosphatase 113 39 - 117 U/L    Total Bilirubin 0.4 0.0 - 1.2 mg/dL    Globulin 3.6 gm/dL    A/G Ratio 1.2 g/dL    BUN/Creatinine Ratio 14.3 7.0 - 25.0    Anion Gap 9.3 5.0 - 15.0 mmol/L    eGFR 63.4 >60.0 mL/min/1.73   Lipid Panel    Collection Time: 04/07/22 11:49 AM    Specimen: Blood   Result Value Ref Range    Total Cholesterol 171 0 - 200 mg/dL    Triglycerides 153 (H) 0 - 150 mg/dL    HDL Cholesterol 40 40 - 60 mg/dL    LDL Cholesterol  104 (H) 0 - 100 mg/dL    VLDL Cholesterol 27 5 - 40 mg/dL    LDL/HDL Ratio 2.51    T4, free    Collection Time: 04/07/22 11:49 AM    Specimen: Blood   Result Value Ref Range    Free T4 1.23 0.93 - 1.70 ng/dL   TSH    Collection Time: 04/07/22 11:49 AM    Specimen: Blood   Result Value Ref Range    TSH 3.610 0.270 - 4.200 uIU/mL   CBC Auto Differential    Collection Time: 04/07/22 11:49 AM    Specimen: Blood   Result Value Ref Range    WBC 11.59 (H) 3.40 - 10.80 10*3/mm3    RBC 6.10 (H) 3.77 - 5.28 10*6/mm3    Hemoglobin 15.9 12.0 - 15.9 g/dL    Hematocrit 49.5 (H) 34.0 - 46.6 %    MCV 81.1 79.0 - 97.0 fL    MCH 26.1 (L) 26.6 - 33.0 pg    MCHC 32.1 31.5 - 35.7 g/dL    RDW 13.9 12.3 - 15.4 %    RDW-SD 41.1 37.0 - 54.0 fl    MPV 9.5 6.0 - 12.0 fL    Platelets 330 140 - 450 10*3/mm3    Neutrophil % 76.0 42.7 - 76.0 %    Lymphocyte % 14.4 (L) 19.6 - 45.3 %    Monocyte % 6.6 5.0 - 12.0 %    Eosinophil % 1.8 0.3 - 6.2 %    Basophil % 0.9 0.0 - 1.5 %    Immature Grans % 0.3 0.0 - 0.5 %    Neutrophils, Absolute 8.80 (H) 1.70 - 7.00 10*3/mm3    Lymphocytes, Absolute 1.67 0.70 - 3.10 10*3/mm3    Monocytes, Absolute 0.77 0.10 - 0.90 10*3/mm3    Eosinophils, Absolute 0.21 0.00 - 0.40 10*3/mm3    Basophils, Absolute 0.10 0.00 - 0.20 10*3/mm3    Immature Grans, Absolute 0.04 0.00 - 0.05 10*3/mm3    nRBC 0.0 0.0 - 0.2 /100 WBC   TROPONIN (IN-HOUSE)    Collection Time: 04/07/22 11:51 AM    Specimen: Blood   Result Value Ref Range    Troponin T <0.010 0.000 - 0.030 ng/mL   ECG 12 Lead     Collection Time: 04/07/22 12:04 PM   Result Value Ref Range    QT Interval 345 ms   Adult Transthoracic Echo Complete W/ Color, Spectral and Contrast if Necessary Per Protocol    Collection Time: 04/27/22  9:50 AM   Result Value Ref Range    Target HR (85%) 130 bpm    Max. Pred. HR (100%) 153 bpm    Ao root diam 3.0 cm    Ao pk celso 143.5 cm/sec    Ao V2 VTI 25.9 cm    SONIDO(I,D) 3.5 cm2    EDV(cubed) 105.8 ml    EDV(MOD-sp4) 87.3 ml    EF(MOD-bp) 65.0 %    EF(MOD-sp4) 65.0 %    ESV(cubed) 28.5 ml    ESV(MOD-sp4) 30.6 ml    IVS/LVPW 0.95 cm    LV mass(C)d 213.9 grams    LV V1 max PG 7.5 mmHg    LV V1 mean PG 3.4 mmHg    LV V1 max 137.2 cm/sec    LVPWd 1.23 cm    MV dec slope 429.5 cm/sec2    MV dec time 0.18 msec    MV V2 VTI 24.4 cm    MVA(VTI) 3.7 cm2    PA acc time 0.07 sec    PA pr(Accel) 48.2 mmHg    PA V2 max 105.3 cm/sec    Pulm A Revs Celso 40.2 cm/sec    RAP systole 3.0 mmHg    RV V1 max PG 3.7 mmHg    RV V1 max 95.6 cm/sec    RV V1 VTI 21.0 cm    RVIDd 1.78 cm    RVSP(TR) 14.1 mmHg    SV(LVOT) 90.3 ml    SV(MOD-sp4) 56.7 ml    SV(RVOT) 131.5 ml    TR max PG 11.1 mmHg    Ao max PG 8.2 mmHg    Ao mean PG 3.9 mmHg    FS 35.4 %    IVSd 1.17 cm    LA dimension(2D) 3.8 cm    LV V1 VTI 25.4 cm    LVIDd 4.7 cm    LVIDs 3.1 cm    LVOT area 3.6 cm2    LVOT diam 2.13 cm    MV E/A 0.75     MV max PG 5.7 mmHg    MV mean PG 2.50 mmHg    RVOT diam 2.8 cm    MV A max celso 103.5 cm/sec    MV E max celso 77.7 cm/sec    Pulm A Revs Dur 0.11 sec    Pulm Kirby Celso 58.2 cm/sec    Pulm Sys Celso 67.8 cm/sec    TR max celso 166.9 cm/sec    Echo EF Estimated 65 %   Stress Test With Myocardial Perfusion - One Day    Collection Time: 04/27/22 10:26 AM   Result Value Ref Range    Target HR (85%) 130 bpm    Max. Pred. HR (100%) 153 bpm    BH CV STRESS PROTOCOL 1 Pharmacologic     Stage 1 1     HR Stage 1 106     BP Stage 1 195/99     Duration Min Stage 1 0     Duration Sec Stage 1 10     Stress Dose Regadenoson Stage 1 0.4     Stress Comments  "Stage 1 10 sec bolus injection     Baseline HR 93 bpm    Baseline /98 mmHg    Peak  bpm    Percent Max Pred HR 69.28 %    Percent Target HR 82 %    Peak /99 mmHg    Recovery HR 94 bpm    Recovery /89 mmHg    BH CV REST NUCLEAR ISOTOPE DOSE 7.5 mCi    BH CV STRESS NUCLEAR ISOTOPE DOSE 21.9 mCi    Nuc Stress EF 70 %         Review of Systems    Objective     /84   Pulse 92   Temp 96.9 °F (36.1 °C) (Infrared)   Ht 160 cm (63\")   Wt 92.1 kg (203 lb)   SpO2 92%   BMI 35.96 kg/m²     Physical Exam  Vitals and nursing note reviewed.   Constitutional:       Appearance: Normal appearance. She is obese.   HENT:      Head: Normocephalic.      Right Ear: External ear normal.      Left Ear: External ear normal.      Nose: Nose normal.      Mouth/Throat:      Mouth: Mucous membranes are moist.   Eyes:      Pupils: Pupils are equal, round, and reactive to light.   Cardiovascular:      Rate and Rhythm: Normal rate and regular rhythm.      Pulses: Normal pulses.      Heart sounds: Normal heart sounds.   Pulmonary:      Effort: Pulmonary effort is normal.      Breath sounds: Normal breath sounds.   Abdominal:      Palpations: Abdomen is soft.   Musculoskeletal:         General: Normal range of motion.      Cervical back: Neck supple.   Skin:     General: Skin is warm.      Capillary Refill: Capillary refill takes less than 2 seconds.   Neurological:      General: No focal deficit present.      Mental Status: She is alert and oriented to person, place, and time.   Psychiatric:         Mood and Affect: Mood normal.         Behavior: Behavior normal.         Thought Content: Thought content normal.         Judgment: Judgment normal.         Result Review :                Assessment & Plan    Diagnoses and all orders for this visit:    1. Secondary hypertension (Primary)  Comments:  continue current medication f/u with cardiology upcoming.     Other orders  -     atorvastatin (LIPITOR) 20 MG tablet; " Take 1 tablet by mouth Daily.  Dispense: 90 tablet; Refill: 0  -     lisinopril (PRINIVIL,ZESTRIL) 10 MG tablet; Take 1 tablet by mouth Daily.  Dispense: 90 tablet; Refill: 1  -     aspirin (aspirin) 81 MG EC tablet; Take 1 tablet by mouth Daily.  Dispense: 90 tablet; Refill: 1      There are no Patient Instructions on file for this visit.    Follow Up   No follow-ups on file.    Patient was given instructions and counseling regarding her condition or for health maintenance advice. Please see specific information pulled into the AVS if appropriate.     Cheryl Perez, APRN    06/10/22

## 2022-06-23 ENCOUNTER — OFFICE VISIT (OUTPATIENT)
Dept: CARDIOLOGY | Facility: CLINIC | Age: 67
End: 2022-06-23

## 2022-06-23 VITALS
HEIGHT: 63 IN | DIASTOLIC BLOOD PRESSURE: 88 MMHG | HEART RATE: 91 BPM | SYSTOLIC BLOOD PRESSURE: 179 MMHG | BODY MASS INDEX: 35.97 KG/M2 | OXYGEN SATURATION: 92 % | WEIGHT: 203 LBS

## 2022-06-23 DIAGNOSIS — R94.39 ABNORMAL NUCLEAR STRESS TEST: ICD-10-CM

## 2022-06-23 DIAGNOSIS — I20.9 ANGINA PECTORIS: Primary | ICD-10-CM

## 2022-06-23 DIAGNOSIS — E78.00 PURE HYPERCHOLESTEROLEMIA: ICD-10-CM

## 2022-06-23 DIAGNOSIS — I10 PRIMARY HYPERTENSION: ICD-10-CM

## 2022-06-23 PROCEDURE — 99204 OFFICE O/P NEW MOD 45 MIN: CPT | Performed by: INTERNAL MEDICINE

## 2022-06-23 NOTE — PROGRESS NOTES
"    Subjective:     Encounter Date:2022      Patient ID: Quynh Du is a 66 y.o. female.    Chief Complaint:  History of Present Illness 66-year-old white female with history of hypertension hyperlipidemia and family history of coronary disease presents to my office for a new consultation.  Patient has been having symptoms of chest pain or shortness of breath on and off and has been to the ER and was seen by the primary care and had testing done.  No complains of any PND or orthopnea.  No palpitation dizziness syncope or swelling of the feet.  She had an echocardiogram which showed normal V function.  She also had a stress test which was abnormal.  /88 (BP Location: Left arm, Patient Position: Sitting, Cuff Size: Adult)   Pulse 91   Ht 160 cm (63\")   Wt 92.1 kg (203 lb)   SpO2 92%   BMI 35.96 kg/m²     The following portions of the patient's history were reviewed and updated as appropriate: allergies, current medications, past family history, past medical history, past social history, past surgical history and problem list.  Past Medical History:   Diagnosis Date   • Depression    • Hyperlipidemia    • Hypertension    • Impaired fasting glucose    • Obesity      Past Surgical History:   Procedure Laterality Date   • CHOLECYSTECTOMY     • TONSILLECTOMY     • TUBAL ABDOMINAL LIGATION       Social History     Socioeconomic History   • Marital status:    Tobacco Use   • Smoking status: Former Smoker     Packs/day: 0.50     Years: 15.00     Pack years: 7.50     Types: Cigarettes     Quit date: 2009     Years since quittin.8   • Smokeless tobacco: Never Used   Vaping Use   • Vaping Use: Never used   Substance and Sexual Activity   • Alcohol use: Not Currently     Comment: socially   • Drug use: No   • Sexual activity: Not Currently     Family History   Problem Relation Age of Onset   • Lung disease Mother    • Hyperlipidemia Mother            • Hypertension Mother    • " Hyperlipidemia Father    • Hypertension Father    • Heart disease Father                Current Outpatient Medications:   •  albuterol sulfate  (90 Base) MCG/ACT inhaler, Inhale 2 puffs Every 4 (Four) Hours As Needed for Wheezing., Disp: 18 g, Rfl: 3  •  aspirin (aspirin) 81 MG EC tablet, Take 1 tablet by mouth Daily., Disp: 90 tablet, Rfl: 1  •  atorvastatin (LIPITOR) 20 MG tablet, Take 1 tablet by mouth Daily., Disp: 90 tablet, Rfl: 0  •  lisinopril (PRINIVIL,ZESTRIL) 10 MG tablet, Take 1 tablet by mouth Daily., Disp: 90 tablet, Rfl: 1  Allergies   Allergen Reactions   • Codeine Hives   • Naproxen Hives       Review of Systems   Constitutional: Negative for fever and malaise/fatigue.   HENT: Negative for ear pain and nosebleeds.    Eyes: Negative for blurred vision and double vision.   Cardiovascular: Positive for chest pain. Negative for dyspnea on exertion and palpitations.   Respiratory: Positive for shortness of breath. Negative for cough.    Skin: Negative for rash.   Musculoskeletal: Negative for joint pain.   Gastrointestinal: Negative for abdominal pain, nausea and vomiting.   Neurological: Negative for focal weakness and headaches.   Psychiatric/Behavioral: Negative for depression. The patient is not nervous/anxious.    All other systems reviewed and are negative.             Objective:     Constitutional:       Appearance: Well-developed.   Eyes:      General: No scleral icterus.     Conjunctiva/sclera: Conjunctivae normal.   HENT:      Head: Normocephalic and atraumatic.   Neck:      Vascular: No carotid bruit or JVD.   Pulmonary:      Effort: Pulmonary effort is normal.      Breath sounds: Normal breath sounds. No wheezing. No rales.   Cardiovascular:      Normal rate. Regular rhythm.   Pulses:     Intact distal pulses.   Abdominal:      General: Bowel sounds are normal.      Palpations: Abdomen is soft.   Musculoskeletal:      Cervical back: Normal range of motion and neck supple.  Skin:     General: Skin is warm and dry.      Findings: No rash.   Neurological:      Mental Status: Alert.         Procedures    Lab Review:       Assessment:          Diagnosis Plan   1. Angina pectoris (HCC)     2. Pure hypercholesterolemia     3. Primary hypertension     4. Abnormal nuclear stress test            Plan:       Patient presented to my office with chest pain or shortness of breath and had abnormal stress test with inferolateral wall ischemia but has normal LV function by echocardiogram  Patient will have a cardiac catheterization performed.  Discussed with patient about procedure risks and benefits  Patient blood pressure is slightly high but she says she has whitecoat hypertension and her blood pressure stable at home  Patient lipid levels are followed by primary doctor and is on a statin.

## 2022-06-24 ENCOUNTER — PATIENT ROUNDING (BHMG ONLY) (OUTPATIENT)
Dept: CARDIOLOGY | Facility: CLINIC | Age: 67
End: 2022-06-24

## 2022-06-24 NOTE — PROGRESS NOTES
A My-Chart message has been sent to the patient for PATIENT [place] with [facility] [place]

## 2022-07-05 ENCOUNTER — LAB (OUTPATIENT)
Dept: LAB | Facility: HOSPITAL | Age: 67
End: 2022-07-05

## 2022-07-05 DIAGNOSIS — E78.00 PURE HYPERCHOLESTEROLEMIA: ICD-10-CM

## 2022-07-05 DIAGNOSIS — R94.39 ABNORMAL NUCLEAR STRESS TEST: ICD-10-CM

## 2022-07-05 DIAGNOSIS — I20.9 ANGINA PECTORIS: ICD-10-CM

## 2022-07-05 DIAGNOSIS — I10 PRIMARY HYPERTENSION: ICD-10-CM

## 2022-07-05 LAB
ANION GAP SERPL CALCULATED.3IONS-SCNC: 10.8 MMOL/L (ref 5–15)
BASOPHILS # BLD AUTO: 0.11 10*3/MM3 (ref 0–0.2)
BASOPHILS NFR BLD AUTO: 0.9 % (ref 0–1.5)
BUN SERPL-MCNC: 12 MG/DL (ref 8–23)
BUN/CREAT SERPL: 16 (ref 7–25)
CALCIUM SPEC-SCNC: 9.8 MG/DL (ref 8.6–10.5)
CHLORIDE SERPL-SCNC: 105 MMOL/L (ref 98–107)
CO2 SERPL-SCNC: 29.2 MMOL/L (ref 22–29)
CREAT SERPL-MCNC: 0.75 MG/DL (ref 0.57–1)
DEPRECATED RDW RBC AUTO: 39.5 FL (ref 37–54)
EGFRCR SERPLBLD CKD-EPI 2021: 87.4 ML/MIN/1.73
EOSINOPHIL # BLD AUTO: 0.66 10*3/MM3 (ref 0–0.4)
EOSINOPHIL NFR BLD AUTO: 5.3 % (ref 0.3–6.2)
ERYTHROCYTE [DISTWIDTH] IN BLOOD BY AUTOMATED COUNT: 13.8 % (ref 12.3–15.4)
GLUCOSE SERPL-MCNC: 135 MG/DL (ref 65–99)
HCT VFR BLD AUTO: 45.8 % (ref 34–46.6)
HGB BLD-MCNC: 14.5 G/DL (ref 12–15.9)
IMM GRANULOCYTES # BLD AUTO: 0.02 10*3/MM3 (ref 0–0.05)
IMM GRANULOCYTES NFR BLD AUTO: 0.2 % (ref 0–0.5)
LYMPHOCYTES # BLD AUTO: 2.82 10*3/MM3 (ref 0.7–3.1)
LYMPHOCYTES NFR BLD AUTO: 22.7 % (ref 19.6–45.3)
MCH RBC QN AUTO: 25.4 PG (ref 26.6–33)
MCHC RBC AUTO-ENTMCNC: 31.7 G/DL (ref 31.5–35.7)
MCV RBC AUTO: 80.4 FL (ref 79–97)
MONOCYTES # BLD AUTO: 0.73 10*3/MM3 (ref 0.1–0.9)
MONOCYTES NFR BLD AUTO: 5.9 % (ref 5–12)
NEUTROPHILS NFR BLD AUTO: 65 % (ref 42.7–76)
NEUTROPHILS NFR BLD AUTO: 8.06 10*3/MM3 (ref 1.7–7)
NRBC BLD AUTO-RTO: 0 /100 WBC (ref 0–0.2)
PLATELET # BLD AUTO: 309 10*3/MM3 (ref 140–450)
PMV BLD AUTO: 9.5 FL (ref 6–12)
POTASSIUM SERPL-SCNC: 4.1 MMOL/L (ref 3.5–5.2)
RBC # BLD AUTO: 5.7 10*6/MM3 (ref 3.77–5.28)
SARS-COV-2 ORF1AB RESP QL NAA+PROBE: NOT DETECTED
SODIUM SERPL-SCNC: 145 MMOL/L (ref 136–145)
WBC NRBC COR # BLD: 12.4 10*3/MM3 (ref 3.4–10.8)

## 2022-07-05 PROCEDURE — 85025 COMPLETE CBC W/AUTO DIFF WBC: CPT

## 2022-07-05 PROCEDURE — U0005 INFEC AGEN DETEC AMPLI PROBE: HCPCS

## 2022-07-05 PROCEDURE — U0004 COV-19 TEST NON-CDC HGH THRU: HCPCS

## 2022-07-05 PROCEDURE — 80048 BASIC METABOLIC PNL TOTAL CA: CPT

## 2022-07-05 PROCEDURE — 36415 COLL VENOUS BLD VENIPUNCTURE: CPT

## 2022-07-05 PROCEDURE — C9803 HOPD COVID-19 SPEC COLLECT: HCPCS

## 2022-07-06 ENCOUNTER — HOSPITAL ENCOUNTER (OUTPATIENT)
Facility: HOSPITAL | Age: 67
Setting detail: HOSPITAL OUTPATIENT SURGERY
Discharge: HOME | End: 2022-07-06
Attending: INTERNAL MEDICINE | Admitting: INTERNAL MEDICINE

## 2022-07-06 VITALS
TEMPERATURE: 99 F | OXYGEN SATURATION: 96 % | SYSTOLIC BLOOD PRESSURE: 124 MMHG | HEART RATE: 85 BPM | WEIGHT: 202 LBS | BODY MASS INDEX: 35.79 KG/M2 | DIASTOLIC BLOOD PRESSURE: 73 MMHG | RESPIRATION RATE: 20 BRPM | HEIGHT: 63 IN

## 2022-07-06 DIAGNOSIS — R94.39 ABNORMAL NUCLEAR STRESS TEST: ICD-10-CM

## 2022-07-06 DIAGNOSIS — E78.00 PURE HYPERCHOLESTEROLEMIA: ICD-10-CM

## 2022-07-06 DIAGNOSIS — I20.9 ANGINA PECTORIS: ICD-10-CM

## 2022-07-06 DIAGNOSIS — I10 PRIMARY HYPERTENSION: ICD-10-CM

## 2022-07-06 PROCEDURE — 99152 MOD SED SAME PHYS/QHP 5/>YRS: CPT | Performed by: INTERNAL MEDICINE

## 2022-07-06 PROCEDURE — 93458 L HRT ARTERY/VENTRICLE ANGIO: CPT | Performed by: INTERNAL MEDICINE

## 2022-07-06 PROCEDURE — C1769 GUIDE WIRE: HCPCS | Performed by: INTERNAL MEDICINE

## 2022-07-06 PROCEDURE — C1760 CLOSURE DEV, VASC: HCPCS | Performed by: INTERNAL MEDICINE

## 2022-07-06 PROCEDURE — C1894 INTRO/SHEATH, NON-LASER: HCPCS | Performed by: INTERNAL MEDICINE

## 2022-07-06 PROCEDURE — 0 IOPAMIDOL PER 1 ML: Performed by: INTERNAL MEDICINE

## 2022-07-06 PROCEDURE — 99153 MOD SED SAME PHYS/QHP EA: CPT | Performed by: INTERNAL MEDICINE

## 2022-07-06 PROCEDURE — 25010000002 MIDAZOLAM PER 1 MG: Performed by: INTERNAL MEDICINE

## 2022-07-06 PROCEDURE — 25010000002 FENTANYL CITRATE (PF) 100 MCG/2ML SOLUTION: Performed by: INTERNAL MEDICINE

## 2022-07-06 RX ORDER — SODIUM CHLORIDE 9 MG/ML
30 INJECTION, SOLUTION INTRAVENOUS CONTINUOUS
Status: DISCONTINUED | OUTPATIENT
Start: 2022-07-06 | End: 2022-07-06 | Stop reason: HOSPADM

## 2022-07-06 RX ORDER — MIDAZOLAM HYDROCHLORIDE 1 MG/ML
INJECTION INTRAMUSCULAR; INTRAVENOUS AS NEEDED
Status: DISCONTINUED | OUTPATIENT
Start: 2022-07-06 | End: 2022-07-06 | Stop reason: HOSPADM

## 2022-07-06 RX ORDER — FENTANYL CITRATE 50 UG/ML
INJECTION, SOLUTION INTRAMUSCULAR; INTRAVENOUS AS NEEDED
Status: DISCONTINUED | OUTPATIENT
Start: 2022-07-06 | End: 2022-07-06 | Stop reason: HOSPADM

## 2022-07-06 RX ORDER — SODIUM CHLORIDE 9 MG/ML
75 INJECTION, SOLUTION INTRAVENOUS CONTINUOUS
Status: DISCONTINUED | OUTPATIENT
Start: 2022-07-06 | End: 2022-07-06 | Stop reason: HOSPADM

## 2022-07-06 RX ORDER — LIDOCAINE HYDROCHLORIDE 20 MG/ML
INJECTION, SOLUTION INFILTRATION; PERINEURAL AS NEEDED
Status: DISCONTINUED | OUTPATIENT
Start: 2022-07-06 | End: 2022-07-06 | Stop reason: HOSPADM

## 2022-07-06 RX ORDER — SODIUM CHLORIDE 9 MG/ML
250 INJECTION, SOLUTION INTRAVENOUS ONCE AS NEEDED
Status: DISCONTINUED | OUTPATIENT
Start: 2022-07-06 | End: 2022-07-06 | Stop reason: HOSPADM

## 2022-07-06 RX ORDER — ACETAMINOPHEN 325 MG/1
650 TABLET ORAL EVERY 4 HOURS PRN
Status: DISCONTINUED | OUTPATIENT
Start: 2022-07-06 | End: 2022-07-06 | Stop reason: HOSPADM

## 2022-07-06 RX ADMIN — SODIUM CHLORIDE 30 ML/HR: 9 INJECTION, SOLUTION INTRAVENOUS at 07:00

## 2022-07-06 NOTE — DISCHARGE INSTRUCTIONS
Post Cath Instructions      Specific Physician Instructions: ***    Drink plenty of fluids for the next 24 hours.  This helps to eliminate the dye used in your procedure through urination.  You may resume a normal diet; however, try to avoid foods that would cause gas or constipation.    Sedative medication given to you during your catheterization may decrease your judgement and reaction time for up to 24-48 hours.  Therefore:  DO NOT drive or operate hazardous machinery (48 hours)  DO NOT consume alcoholic beverages  DO NOT make any important/legal decisions  Have someone stay with you for at least 24 hours    To allow proper healing and prevent bleeding, the following activities are to be strictly avoided for the next 24-48 hours:  Excessive bending at wound site  Straining (anything that would tense up muscles around the affected puncture site)  Lifting objects greater than 5 pounds, pushing, or pulling for 5 days    For Groin Cases:  Refrain from sexual activity  Refrain from running or vigorous walking  No prolonged sitting or standing  Limit stair climbing as much as possible    Keep the puncture site clean and dry.  You may remove the dressing tomorrow and replace it with a band-aid for at least one additional day.  Gently clean the site with mild soap and water.  No scrubbing/rubbing and lightly pat the area dry.  Showers are acceptable; however, avoid submerging in water (tub baths, hot tubs, swimming pools, dishwater, etc…) for at least one week.  The site should be completely healed before resuming these activities to reduce the risk of infection.  Check the site often.  Watch for signs and symptoms of infection and notify your physician if any of the following occur:  Bleeding or an increase in swelling at the puncture site  Fever  Increased soreness around puncture site  Foul odor or significant drainage from the puncture site  Swelling, redness, or warmth at the puncture site    **A bruise or small  “pea sized” lump under the skin at the puncture site is not unusual.  This should disappear within 3-4 weeks.**  CONTACT YOUR PHYSICIAN OR CALL 911 IF YOU EXPERIENCE ANY OF THE FOLLOWING:  Increased angina (chest pain) or frequent sensations of pressure, burning, pain, or other discomfort in the chest, arm, jaws, or stomach  Lightheadedness, dizziness, faint feeling, sweating, or difficulty breathing  Odd sensation changes like numbness, tingling, coldness, or pain in the arm or leg in which the catheter was inserted  Limb in which the catheter was inserted becomes pale/bluish in color    IMPORTANT:  Although this occurs very rarely, if you should develop bright red or excessive bleeding, feel a “pop” inside at the insertion site, or notice a sudden increase in swelling larger than a walnut, you should call 911.  Hold continuous firm pressure to the access site until emergency personnel arrive.  It is best if someone else can do this for you.

## 2022-09-09 ENCOUNTER — LAB REQUISITION (OUTPATIENT)
Dept: LAB | Facility: HOSPITAL | Age: 67
End: 2022-09-09

## 2022-09-09 ENCOUNTER — OFFICE VISIT (OUTPATIENT)
Dept: FAMILY MEDICINE CLINIC | Facility: CLINIC | Age: 67
End: 2022-09-09

## 2022-09-09 VITALS
HEART RATE: 87 BPM | RESPIRATION RATE: 18 BRPM | DIASTOLIC BLOOD PRESSURE: 100 MMHG | BODY MASS INDEX: 36.07 KG/M2 | SYSTOLIC BLOOD PRESSURE: 140 MMHG | OXYGEN SATURATION: 91 % | WEIGHT: 203.6 LBS | HEIGHT: 63 IN | TEMPERATURE: 97.2 F

## 2022-09-09 DIAGNOSIS — R31.9 HEMATURIA, UNSPECIFIED: ICD-10-CM

## 2022-09-09 DIAGNOSIS — N39.0 URINARY TRACT INFECTION WITH HEMATURIA, SITE UNSPECIFIED: Chronic | ICD-10-CM

## 2022-09-09 DIAGNOSIS — R31.9 URINARY TRACT INFECTION WITH HEMATURIA, SITE UNSPECIFIED: Chronic | ICD-10-CM

## 2022-09-09 DIAGNOSIS — I10 PRIMARY HYPERTENSION: Chronic | ICD-10-CM

## 2022-09-09 DIAGNOSIS — J44.9 CHRONIC OBSTRUCTIVE PULMONARY DISEASE, UNSPECIFIED COPD TYPE: Chronic | ICD-10-CM

## 2022-09-09 DIAGNOSIS — N39.0 URINARY TRACT INFECTION, SITE NOT SPECIFIED: ICD-10-CM

## 2022-09-09 DIAGNOSIS — I20.9 ANGINA PECTORIS: Chronic | ICD-10-CM

## 2022-09-09 DIAGNOSIS — E78.2 MIXED HYPERLIPIDEMIA: Chronic | ICD-10-CM

## 2022-09-09 LAB
BACTERIA UR QL AUTO: ABNORMAL /HPF
BILIRUB BLD-MCNC: NEGATIVE MG/DL
BILIRUB UR QL STRIP: NEGATIVE
CLARITY UR: ABNORMAL
CLARITY, POC: ABNORMAL
COD CRY URNS QL: ABNORMAL /HPF
COLOR UR: ABNORMAL
COLOR UR: ABNORMAL
EXPIRATION DATE: ABNORMAL
GLUCOSE UR STRIP-MCNC: NEGATIVE MG/DL
GLUCOSE UR STRIP-MCNC: NEGATIVE MG/DL
HGB UR QL STRIP.AUTO: ABNORMAL
HYALINE CASTS UR QL AUTO: ABNORMAL /LPF
KETONES UR QL STRIP: ABNORMAL
KETONES UR QL: NEGATIVE
LEUKOCYTE EST, POC: NEGATIVE
LEUKOCYTE ESTERASE UR QL STRIP.AUTO: ABNORMAL
Lab: ABNORMAL
NITRITE UR QL STRIP: NEGATIVE
NITRITE UR-MCNC: NEGATIVE MG/ML
PH UR STRIP.AUTO: 5.5 [PH] (ref 5–8)
PH UR: 5.5 [PH] (ref 5–8)
PROT UR QL STRIP: ABNORMAL
PROT UR STRIP-MCNC: ABNORMAL MG/DL
RBC # UR STRIP: ABNORMAL /HPF
RBC # UR STRIP: ABNORMAL /UL
REF LAB TEST METHOD: ABNORMAL
SP GR UR STRIP: 1.02 (ref 1–1.03)
SP GR UR: 1.03 (ref 1–1.03)
SQUAMOUS #/AREA URNS HPF: ABNORMAL /HPF
UROBILINOGEN UR QL STRIP: ABNORMAL
UROBILINOGEN UR QL: ABNORMAL
WBC # UR STRIP: ABNORMAL /HPF

## 2022-09-09 PROCEDURE — 81003 URINALYSIS AUTO W/O SCOPE: CPT | Performed by: NURSE PRACTITIONER

## 2022-09-09 PROCEDURE — 81001 URINALYSIS AUTO W/SCOPE: CPT | Performed by: NURSE PRACTITIONER

## 2022-09-09 PROCEDURE — 99214 OFFICE O/P EST MOD 30 MIN: CPT | Performed by: NURSE PRACTITIONER

## 2022-09-09 RX ORDER — ALBUTEROL SULFATE 90 UG/1
2 AEROSOL, METERED RESPIRATORY (INHALATION) EVERY 4 HOURS PRN
Qty: 18 G | Refills: 3 | Status: SHIPPED | OUTPATIENT
Start: 2022-09-09

## 2022-09-09 RX ORDER — SULFAMETHOXAZOLE AND TRIMETHOPRIM 800; 160 MG/1; MG/1
1 TABLET ORAL 2 TIMES DAILY
Qty: 10 TABLET | Refills: 0 | Status: SHIPPED | OUTPATIENT
Start: 2022-09-09 | End: 2022-12-22

## 2022-09-09 NOTE — PATIENT INSTRUCTIONS
Take bactrim with water .   Follow up on urine culture Monday.  If fever vomiting go to the ED.  Monitor blood pressure no more than 3 times a week. Exercise daily

## 2022-09-09 NOTE — PROGRESS NOTES
Subjective        Quynh Du is a 66 y.o. female.     Chief Complaint   Patient presents with   • Hyperlipidemia     3 mth f/u   • Depression   • Hypertension       History of Present Illness  Patient is here for management of her hypertension, depression, hyperlipidemia: new problem thinks she has uti    Blood in urine; she reports she has go bathroom but she is not having a lot of urine: urine dip brown cloudy large blood . No fever no vomiting started yesterday has not taken anything for this.     Hypertension:  white coat syndrome when she comes in she is checking blood pressure at home and is 128/73 with her wrist meter. We checked her meter against my blood pressure numbers today. Appears very accurate.   Hypertension: taking lisinopril 10 mg daily completed a cardiac cath since last seen for reported chest pain.   Reviewed results: normal coronaries normal LV function, stress test : abnormal perfusion study. Myocardial perfusion shows moderate size sever intensity reversible ischemia involving the inferolateral wall EF 70% impressions consistent with intermediate risk study.   Echo left V wall thickness is consistent with mild concentric hypertrophy. eF 65%  grade 1 impaired relaxation left ventricular diastolic function.  Reviewed notes Dr wang : angina pectoris  Pure hypercholesterolemia primary hypertension  She completed the cardiac cath.     Hyperlipidemia atorvastatin 20 mg daily has hypertension. 4/2022  tri 153 high hdl 40 normal       Angina pectoris with normal cardiac cath. Taking aspirin 81 mg daily.     Copd she has albuterol as needed. She was having dyspnea and chest pain had cardiac work up. She is walking more now she reports.       The following portions of the patient's history were reviewed and updated as appropriate: allergies, current medications, past family history, past medical history, past social history, past surgical history and problem list.      Current Outpatient  Medications:   •  albuterol sulfate  (90 Base) MCG/ACT inhaler, Inhale 2 puffs Every 4 (Four) Hours As Needed for Wheezing., Disp: 18 g, Rfl: 3  •  aspirin (aspirin) 81 MG EC tablet, Take 1 tablet by mouth Daily., Disp: 90 tablet, Rfl: 1  •  atorvastatin (LIPITOR) 20 MG tablet, Take 1 tablet by mouth Daily., Disp: 90 tablet, Rfl: 0  •  lisinopril (PRINIVIL,ZESTRIL) 10 MG tablet, Take 1 tablet by mouth Daily., Disp: 90 tablet, Rfl: 1  •  sulfamethoxazole-trimethoprim (Bactrim DS) 800-160 MG per tablet, Take 1 tablet by mouth 2 (Two) Times a Day., Disp: 10 tablet, Rfl: 0    Recent Results (from the past 4032 hour(s))   Comprehensive Metabolic Panel    Collection Time: 04/07/22 11:49 AM    Specimen: Blood   Result Value Ref Range    Glucose 159 (H) 65 - 99 mg/dL    BUN 14 8 - 23 mg/dL    Creatinine 0.98 0.57 - 1.00 mg/dL    Sodium 140 136 - 145 mmol/L    Potassium 4.3 3.5 - 5.2 mmol/L    Chloride 100 98 - 107 mmol/L    CO2 30.7 (H) 22.0 - 29.0 mmol/L    Calcium 9.5 8.6 - 10.5 mg/dL    Total Protein 7.8 6.0 - 8.5 g/dL    Albumin 4.20 3.50 - 5.20 g/dL    ALT (SGPT) 17 1 - 33 U/L    AST (SGOT) 16 1 - 32 U/L    Alkaline Phosphatase 113 39 - 117 U/L    Total Bilirubin 0.4 0.0 - 1.2 mg/dL    Globulin 3.6 gm/dL    A/G Ratio 1.2 g/dL    BUN/Creatinine Ratio 14.3 7.0 - 25.0    Anion Gap 9.3 5.0 - 15.0 mmol/L    eGFR 63.4 >60.0 mL/min/1.73   Lipid Panel    Collection Time: 04/07/22 11:49 AM    Specimen: Blood   Result Value Ref Range    Total Cholesterol 171 0 - 200 mg/dL    Triglycerides 153 (H) 0 - 150 mg/dL    HDL Cholesterol 40 40 - 60 mg/dL    LDL Cholesterol  104 (H) 0 - 100 mg/dL    VLDL Cholesterol 27 5 - 40 mg/dL    LDL/HDL Ratio 2.51    T4, free    Collection Time: 04/07/22 11:49 AM    Specimen: Blood   Result Value Ref Range    Free T4 1.23 0.93 - 1.70 ng/dL   TSH    Collection Time: 04/07/22 11:49 AM    Specimen: Blood   Result Value Ref Range    TSH 3.610 0.270 - 4.200 uIU/mL   CBC Auto Differential     Collection Time: 04/07/22 11:49 AM    Specimen: Blood   Result Value Ref Range    WBC 11.59 (H) 3.40 - 10.80 10*3/mm3    RBC 6.10 (H) 3.77 - 5.28 10*6/mm3    Hemoglobin 15.9 12.0 - 15.9 g/dL    Hematocrit 49.5 (H) 34.0 - 46.6 %    MCV 81.1 79.0 - 97.0 fL    MCH 26.1 (L) 26.6 - 33.0 pg    MCHC 32.1 31.5 - 35.7 g/dL    RDW 13.9 12.3 - 15.4 %    RDW-SD 41.1 37.0 - 54.0 fl    MPV 9.5 6.0 - 12.0 fL    Platelets 330 140 - 450 10*3/mm3    Neutrophil % 76.0 42.7 - 76.0 %    Lymphocyte % 14.4 (L) 19.6 - 45.3 %    Monocyte % 6.6 5.0 - 12.0 %    Eosinophil % 1.8 0.3 - 6.2 %    Basophil % 0.9 0.0 - 1.5 %    Immature Grans % 0.3 0.0 - 0.5 %    Neutrophils, Absolute 8.80 (H) 1.70 - 7.00 10*3/mm3    Lymphocytes, Absolute 1.67 0.70 - 3.10 10*3/mm3    Monocytes, Absolute 0.77 0.10 - 0.90 10*3/mm3    Eosinophils, Absolute 0.21 0.00 - 0.40 10*3/mm3    Basophils, Absolute 0.10 0.00 - 0.20 10*3/mm3    Immature Grans, Absolute 0.04 0.00 - 0.05 10*3/mm3    nRBC 0.0 0.0 - 0.2 /100 WBC   TROPONIN (IN-HOUSE)    Collection Time: 04/07/22 11:51 AM    Specimen: Blood   Result Value Ref Range    Troponin T <0.010 0.000 - 0.030 ng/mL   ECG 12 Lead    Collection Time: 04/07/22 12:04 PM   Result Value Ref Range    QT Interval 345 ms   Adult Transthoracic Echo Complete W/ Color, Spectral and Contrast if Necessary Per Protocol    Collection Time: 04/27/22  9:50 AM   Result Value Ref Range    Target HR (85%) 130 bpm    Max. Pred. HR (100%) 153 bpm    Ao root diam 3.0 cm    Ao pk kassandra 143.5 cm/sec    Ao V2 VTI 25.9 cm    SONIDO(I,D) 3.5 cm2    EDV(cubed) 105.8 ml    EDV(MOD-sp4) 87.3 ml    EF(MOD-bp) 65.0 %    EF(MOD-sp4) 65.0 %    ESV(cubed) 28.5 ml    ESV(MOD-sp4) 30.6 ml    IVS/LVPW 0.95 cm    LV mass(C)d 213.9 grams    LV V1 max PG 7.5 mmHg    LV V1 mean PG 3.4 mmHg    LV V1 max 137.2 cm/sec    LVPWd 1.23 cm    MV dec slope 429.5 cm/sec2    MV dec time 0.18 msec    MV V2 VTI 24.4 cm    MVA(VTI) 3.7 cm2    PA acc time 0.07 sec    PA pr(Accel) 48.2 mmHg     PA V2 max 105.3 cm/sec    Pulm A Revs Celso 40.2 cm/sec    RAP systole 3.0 mmHg    RV V1 max PG 3.7 mmHg    RV V1 max 95.6 cm/sec    RV V1 VTI 21.0 cm    RVIDd 1.78 cm    RVSP(TR) 14.1 mmHg    SV(LVOT) 90.3 ml    SV(MOD-sp4) 56.7 ml    SV(RVOT) 131.5 ml    TR max PG 11.1 mmHg    Ao max PG 8.2 mmHg    Ao mean PG 3.9 mmHg    FS 35.4 %    IVSd 1.17 cm    LA dimension(2D) 3.8 cm    LV V1 VTI 25.4 cm    LVIDd 4.7 cm    LVIDs 3.1 cm    LVOT area 3.6 cm2    LVOT diam 2.13 cm    MV E/A 0.75     MV max PG 5.7 mmHg    MV mean PG 2.50 mmHg    RVOT diam 2.8 cm    MV A max celso 103.5 cm/sec    MV E max celso 77.7 cm/sec    Pulm A Revs Dur 0.11 sec    Pulm Kirby Celso 58.2 cm/sec    Pulm Sys Celso 67.8 cm/sec    TR max celso 166.9 cm/sec    Echo EF Estimated 65 %   Stress Test With Myocardial Perfusion - One Day    Collection Time: 04/27/22 10:26 AM   Result Value Ref Range    Target HR (85%) 130 bpm    Max. Pred. HR (100%) 153 bpm     CV STRESS PROTOCOL 1 Pharmacologic     Stage 1 1     HR Stage 1 106     BP Stage 1 195/99     Duration Min Stage 1 0     Duration Sec Stage 1 10     Stress Dose Regadenoson Stage 1 0.4     Stress Comments Stage 1 10 sec bolus injection     Baseline HR 93 bpm    Baseline /98 mmHg    Peak  bpm    Percent Max Pred HR 69.28 %    Percent Target HR 82 %    Peak /99 mmHg    Recovery HR 94 bpm    Recovery /89 mmHg     CV REST NUCLEAR ISOTOPE DOSE 7.5 mCi     CV STRESS NUCLEAR ISOTOPE DOSE 21.9 mCi    Nuc Stress EF 70 %   Basic Metabolic Panel    Collection Time: 07/05/22  8:00 AM    Specimen: Blood   Result Value Ref Range    Glucose 135 (H) 65 - 99 mg/dL    BUN 12 8 - 23 mg/dL    Creatinine 0.75 0.57 - 1.00 mg/dL    Sodium 145 136 - 145 mmol/L    Potassium 4.1 3.5 - 5.2 mmol/L    Chloride 105 98 - 107 mmol/L    CO2 29.2 (H) 22.0 - 29.0 mmol/L    Calcium 9.8 8.6 - 10.5 mg/dL    BUN/Creatinine Ratio 16.0 7.0 - 25.0    Anion Gap 10.8 5.0 - 15.0 mmol/L    eGFR 87.4 >60.0 mL/min/1.73  "  COVID-19,APTIMA PANTHER(DOMINIC), NOEL/ SHAZIA, NP/OP SWAB IN UTM/VTM/SALINE TRANSPORT MEDIA,24 HR TAT - Swab, Nasopharynx    Collection Time: 07/05/22  8:00 AM    Specimen: Nasopharynx; Swab   Result Value Ref Range    COVID19 Not Detected Not Detected - Ref. Range   CBC Auto Differential    Collection Time: 07/05/22  8:00 AM    Specimen: Blood   Result Value Ref Range    WBC 12.40 (H) 3.40 - 10.80 10*3/mm3    RBC 5.70 (H) 3.77 - 5.28 10*6/mm3    Hemoglobin 14.5 12.0 - 15.9 g/dL    Hematocrit 45.8 34.0 - 46.6 %    MCV 80.4 79.0 - 97.0 fL    MCH 25.4 (L) 26.6 - 33.0 pg    MCHC 31.7 31.5 - 35.7 g/dL    RDW 13.8 12.3 - 15.4 %    RDW-SD 39.5 37.0 - 54.0 fl    MPV 9.5 6.0 - 12.0 fL    Platelets 309 140 - 450 10*3/mm3    Neutrophil % 65.0 42.7 - 76.0 %    Lymphocyte % 22.7 19.6 - 45.3 %    Monocyte % 5.9 5.0 - 12.0 %    Eosinophil % 5.3 0.3 - 6.2 %    Basophil % 0.9 0.0 - 1.5 %    Immature Grans % 0.2 0.0 - 0.5 %    Neutrophils, Absolute 8.06 (H) 1.70 - 7.00 10*3/mm3    Lymphocytes, Absolute 2.82 0.70 - 3.10 10*3/mm3    Monocytes, Absolute 0.73 0.10 - 0.90 10*3/mm3    Eosinophils, Absolute 0.66 (H) 0.00 - 0.40 10*3/mm3    Basophils, Absolute 0.11 0.00 - 0.20 10*3/mm3    Immature Grans, Absolute 0.02 0.00 - 0.05 10*3/mm3    nRBC 0.0 0.0 - 0.2 /100 WBC         Review of Systems    Objective     /100   Pulse 87   Temp 97.2 °F (36.2 °C) (Infrared)   Resp 18   Ht 160 cm (62.99\")   Wt 92.4 kg (203 lb 9.6 oz)   SpO2 91%   BMI 36.08 kg/m²     Physical Exam  Vitals and nursing note reviewed.   Constitutional:       Appearance: She is obese.   HENT:      Head: Normocephalic.      Right Ear: External ear normal.      Left Ear: External ear normal.      Nose: Nose normal.      Mouth/Throat:      Mouth: Mucous membranes are moist.   Eyes:      Conjunctiva/sclera: Conjunctivae normal.      Pupils: Pupils are equal, round, and reactive to light.   Cardiovascular:      Rate and Rhythm: Normal rate and regular rhythm.      " Pulses: Normal pulses.      Heart sounds: Normal heart sounds.   Pulmonary:      Effort: Pulmonary effort is normal.      Breath sounds: Normal breath sounds.   Abdominal:      General: Bowel sounds are normal.      Palpations: Abdomen is soft.   Musculoskeletal:         General: Normal range of motion.      Cervical back: Normal range of motion and neck supple.   Skin:     General: Skin is warm and dry.      Capillary Refill: Capillary refill takes less than 2 seconds.   Neurological:      General: No focal deficit present.      Mental Status: She is alert and oriented to person, place, and time.   Psychiatric:         Mood and Affect: Mood normal.         Behavior: Behavior normal.         Thought Content: Thought content normal.         Judgment: Judgment normal.         Result Review :                Assessment & Plan    Diagnoses and all orders for this visit:    1. Angina pectoris (HCC)  Comments:  stable    2. Mixed hyperlipidemia  Comments:  stable    3. Primary hypertension  Comments:  stable    4. Chronic obstructive pulmonary disease, unspecified COPD type (HCC)  Comments:  stable    5. Urinary tract infection with hematuria, site unspecified  Comments:  sent for culture start bactrim     Other orders  -     sulfamethoxazole-trimethoprim (Bactrim DS) 800-160 MG per tablet; Take 1 tablet by mouth 2 (Two) Times a Day.  Dispense: 10 tablet; Refill: 0  -     albuterol sulfate  (90 Base) MCG/ACT inhaler; Inhale 2 puffs Every 4 (Four) Hours As Needed for Wheezing.  Dispense: 18 g; Refill: 3      Patient Instructions   Take bactrim with water .   Follow up on urine culture Monday.  If fever vomiting go to the ED.  Monitor blood pressure no more than 3 times a week. Exercise daily      Follow Up   No follow-ups on file.    Patient was given instructions and counseling regarding her condition or for health maintenance advice. Please see specific information pulled into the AVS if appropriate.     Cheryl LOWERY  Chris, ALYSON    09/09/22

## 2022-09-21 ENCOUNTER — LAB (OUTPATIENT)
Dept: LAB | Facility: HOSPITAL | Age: 67
End: 2022-09-21

## 2022-09-21 DIAGNOSIS — N39.0 URINARY TRACT INFECTION WITHOUT HEMATURIA, SITE UNSPECIFIED: ICD-10-CM

## 2022-09-21 DIAGNOSIS — N39.0 URINARY TRACT INFECTION WITHOUT HEMATURIA, SITE UNSPECIFIED: Primary | ICD-10-CM

## 2022-09-21 LAB
BACTERIA UR QL AUTO: ABNORMAL /HPF
BILIRUB UR QL STRIP: NEGATIVE
CLARITY UR: ABNORMAL
COLOR UR: YELLOW
GLUCOSE UR STRIP-MCNC: NEGATIVE MG/DL
HGB UR QL STRIP.AUTO: ABNORMAL
HYALINE CASTS UR QL AUTO: ABNORMAL /LPF
KETONES UR QL STRIP: ABNORMAL
LEUKOCYTE ESTERASE UR QL STRIP.AUTO: NEGATIVE
NITRITE UR QL STRIP: NEGATIVE
PH UR STRIP.AUTO: 6 [PH] (ref 5–8)
PROT UR QL STRIP: ABNORMAL
RBC # UR STRIP: ABNORMAL /HPF
REF LAB TEST METHOD: ABNORMAL
SP GR UR STRIP: >=1.03 (ref 1–1.03)
SQUAMOUS #/AREA URNS HPF: ABNORMAL /HPF
UROBILINOGEN UR QL STRIP: ABNORMAL
WBC # UR STRIP: ABNORMAL /HPF

## 2022-09-21 PROCEDURE — 81001 URINALYSIS AUTO W/SCOPE: CPT

## 2022-09-22 RX ORDER — FLUCONAZOLE 150 MG/1
150 TABLET ORAL ONCE
Qty: 1 TABLET | Refills: 1 | Status: SHIPPED | OUTPATIENT
Start: 2022-09-22 | End: 2022-09-22

## 2022-12-09 RX ORDER — ATORVASTATIN CALCIUM 20 MG/1
20 TABLET, FILM COATED ORAL DAILY
Qty: 90 TABLET | Refills: 0 | Status: SHIPPED | OUTPATIENT
Start: 2022-12-09 | End: 2023-03-01 | Stop reason: SDUPTHER

## 2022-12-21 ENCOUNTER — TELEPHONE (OUTPATIENT)
Dept: FAMILY MEDICINE CLINIC | Facility: CLINIC | Age: 67
End: 2022-12-21

## 2022-12-21 NOTE — TELEPHONE ENCOUNTER
Caller: Quynh Du    Relationship: Self    Best call back number:[phone]     What medication are you requesting: ANTIBIOTIC AND PREDNISONE     What are your current symptoms: SORE THROAT, DRAINAGE, COUGH    How long have you been experiencing symptoms: 3 DAYS     Have you had these symptoms before:    [x] Yes  [] No    Have you been treated for these symptoms before:   [x] Yes  [] No    If a prescription is needed, what is your preferred pharmacy and phone number: [facility] [phone] [facility] [place] [facility] [address] - [phone] [facility] [phone]      Additional notes: PATIENT WOULD LIKE TO GET A MEDICATION CALLED IN TO HELP WITH HER SYMPTOMS. PATIENT STATES SHE HAS TAKEN 2 COVID TEST AND THEY'RE BOTH NEGATIVE    PLEASE LET PATIENT KNOW IF SHE NEEDS TO MAKE AN APPOINTMENT

## 2022-12-21 NOTE — TELEPHONE ENCOUNTER
Spoke to the patient, advised her she must be seen to get an antibiotic. Pt agreed to schedule appt for tomorrow, 12/22.

## 2022-12-22 ENCOUNTER — OFFICE VISIT (OUTPATIENT)
Dept: FAMILY MEDICINE CLINIC | Facility: CLINIC | Age: 67
End: 2022-12-22

## 2022-12-22 VITALS
TEMPERATURE: 99.6 F | BODY MASS INDEX: 37.54 KG/M2 | WEIGHT: 204 LBS | HEART RATE: 98 BPM | OXYGEN SATURATION: 94 % | SYSTOLIC BLOOD PRESSURE: 140 MMHG | DIASTOLIC BLOOD PRESSURE: 90 MMHG | HEIGHT: 62 IN

## 2022-12-22 DIAGNOSIS — I10 PRIMARY HYPERTENSION: ICD-10-CM

## 2022-12-22 DIAGNOSIS — J44.9 CHRONIC OBSTRUCTIVE PULMONARY DISEASE, UNSPECIFIED COPD TYPE: Primary | ICD-10-CM

## 2022-12-22 DIAGNOSIS — R05.1 ACUTE COUGH: ICD-10-CM

## 2022-12-22 DIAGNOSIS — J02.9 SORE THROAT: ICD-10-CM

## 2022-12-22 PROBLEM — R31.9 URINARY TRACT INFECTION WITH HEMATURIA: Status: RESOLVED | Noted: 2022-09-09 | Resolved: 2022-12-22

## 2022-12-22 PROBLEM — N39.0 URINARY TRACT INFECTION WITH HEMATURIA: Status: RESOLVED | Noted: 2022-09-09 | Resolved: 2022-12-22

## 2022-12-22 LAB
EXPIRATION DATE: NORMAL
EXPIRATION DATE: NORMAL
FLUAV AG UPPER RESP QL IA.RAPID: NOT DETECTED
FLUBV AG UPPER RESP QL IA.RAPID: NOT DETECTED
INTERNAL CONTROL: NORMAL
INTERNAL CONTROL: NORMAL
Lab: NORMAL
Lab: NORMAL
S PYO AG THROAT QL: NEGATIVE
SARS-COV-2 AG UPPER RESP QL IA.RAPID: NOT DETECTED

## 2022-12-22 PROCEDURE — 99214 OFFICE O/P EST MOD 30 MIN: CPT | Performed by: NURSE PRACTITIONER

## 2022-12-22 PROCEDURE — 87880 STREP A ASSAY W/OPTIC: CPT | Performed by: NURSE PRACTITIONER

## 2022-12-22 PROCEDURE — 87428 SARSCOV & INF VIR A&B AG IA: CPT | Performed by: NURSE PRACTITIONER

## 2022-12-22 RX ORDER — PREDNISONE 10 MG/1
TABLET ORAL
Qty: 20 TABLET | Refills: 0 | Status: SHIPPED | OUTPATIENT
Start: 2022-12-22 | End: 2022-12-30

## 2022-12-22 RX ORDER — IPRATROPIUM BROMIDE AND ALBUTEROL SULFATE 2.5; .5 MG/3ML; MG/3ML
3 SOLUTION RESPIRATORY (INHALATION) EVERY 4 HOURS PRN
Qty: 360 ML | Refills: 0 | Status: SHIPPED | OUTPATIENT
Start: 2022-12-22 | End: 2022-12-23 | Stop reason: SDUPTHER

## 2022-12-22 RX ORDER — CEFDINIR 300 MG/1
300 CAPSULE ORAL 2 TIMES DAILY
Qty: 14 CAPSULE | Refills: 0 | Status: SHIPPED | OUTPATIENT
Start: 2022-12-22 | End: 2023-03-16

## 2022-12-22 RX ORDER — IPRATROPIUM BROMIDE AND ALBUTEROL SULFATE 2.5; .5 MG/3ML; MG/3ML
3 SOLUTION RESPIRATORY (INHALATION) EVERY 4 HOURS PRN
Qty: 360 ML | Refills: 0 | Status: SHIPPED | OUTPATIENT
Start: 2022-12-22 | End: 2022-12-22 | Stop reason: SDUPTHER

## 2022-12-22 NOTE — PROGRESS NOTES
Subjective   {CC  Problem List  Visit Diagnosis   Encounters  Notes  Medications  Labs  Result Review Imaging  Media :23}     Quynh Du is a 67 y.o. female.     Chief Complaint   Patient presents with   • Cough     COUGH, SORE THROAT, SOA X4 DAYS       History of Present Illness  PATIENT IS HERE FOR SYMPTOMS STARTED 5 DAYS AGO.  She completed flu shot and covid booster.  She is using her breathing treatments and taking mucinex.  She denies fever or chills. She feels short of air. I do not have any documentation of nebulizer medications.   Her meds may be .     covid flu strep negative.   The following portions of the patient's history were reviewed and updated as appropriate: allergies, current medications, past family history, past medical history, past social history, past surgical history and problem list.      Current Outpatient Medications:   •  albuterol sulfate  (90 Base) MCG/ACT inhaler, Inhale 2 puffs Every 4 (Four) Hours As Needed for Wheezing., Disp: 18 g, Rfl: 3  •  aspirin (aspirin) 81 MG EC tablet, Take 1 tablet by mouth Daily., Disp: 90 tablet, Rfl: 1  •  atorvastatin (LIPITOR) 20 MG tablet, Take 1 tablet by mouth Daily., Disp: 90 tablet, Rfl: 0  •  ELDERBERRY PO, Take  by mouth., Disp: , Rfl:   •  lisinopril (PRINIVIL,ZESTRIL) 10 MG tablet, Take 1 tablet by mouth Daily., Disp: 90 tablet, Rfl: 1  •  cefdinir (OMNICEF) 300 MG capsule, Take 1 capsule by mouth 2 (Two) Times a Day., Disp: 14 capsule, Rfl: 0  •  ipratropium-albuterol (DUO-NEB) 0.5-2.5 mg/3 ml nebulizer, Take 3 mL by nebulization Every 4 (Four) Hours As Needed for Wheezing., Disp: 360 mL, Rfl: 0  •  predniSONE (DELTASONE) 10 MG tablet, Take 4 tablets by mouth Daily for 2 days, THEN 3 tablets Daily for 2 days, THEN 2 tablets Daily for 2 days, THEN 1 tablet Daily for 2 days., Disp: 20 tablet, Rfl: 0    Recent Results (from the past 4032 hour(s))   Urinalysis With Culture If Indicated - Urine, Clean Catch     Collection Time: 09/09/22 10:30 AM    Specimen: Urine, Clean Catch   Result Value Ref Range    Color, UA Dark Yellow (A) Yellow, Straw    Appearance, UA Turbid (A) Clear    pH, UA 5.5 5.0 - 8.0    Specific Gravity, UA 1.023 1.005 - 1.030    Glucose, UA Negative Negative    Ketones, UA Trace (A) Negative    Bilirubin, UA Negative Negative    Blood, UA Large (3+) (A) Negative    Protein, UA >=300 mg/dL (3+) (A) Negative    Leuk Esterase, UA Small (1+) (A) Negative    Nitrite, UA Negative Negative    Urobilinogen, UA 0.2 E.U./dL 0.2 - 1.0 E.U./dL   Urinalysis, Microscopic Only - Urine, Clean Catch    Collection Time: 09/09/22 10:30 AM    Specimen: Urine, Clean Catch   Result Value Ref Range    RBC, UA Too Numerous to Count (A) None Seen, 0-2 /HPF    WBC, UA 3-5 (A) None Seen, 0-2 /HPF    Bacteria, UA 4+ (A) None Seen /HPF    Squamous Epithelial Cells, UA 0-2 None Seen, 0-2 /HPF    Hyaline Casts, UA 0-2 None Seen /LPF    Calcium Oxalate Crystals, UA Large/3+ None Seen /HPF    Methodology Manual Light Microscopy    POCT urinalysis dipstick, automated    Collection Time: 09/09/22 11:02 AM    Specimen: Urine   Result Value Ref Range    Color Other (A) Yellow, Straw, Dark Yellow, Dang    Clarity, UA Cloudy (A) Clear    Specific Gravity  1.030 1.005 - 1.030    pH, Urine 5.5 5.0 - 8.0    Leukocytes Negative Negative    Nitrite, UA Negative Negative    Protein,  mg/dL (A) Negative mg/dL    Glucose, UA Negative Negative mg/dL    Ketones, UA Negative Negative    Urobilinogen, UA 0.2 E.U./dL Normal, 0.2 E.U./dL    Bilirubin Negative Negative    Blood, UA Large (A) Negative    Lot Number [id number]     Expiration Date 2,[id number]    Urinalysis With Culture If Indicated - Urine, Clean Catch    Collection Time: 09/21/22  1:59 PM    Specimen: Urine, Clean Catch   Result Value Ref Range    Color, UA Yellow Yellow, Straw    Appearance, UA Hazy (A) Clear    pH, UA 6.0 5.0 - 8.0    Specific Gravity, UA >=1.030 1.005 - 1.030     "Glucose, UA Negative Negative    Ketones, UA Trace (A) Negative    Bilirubin, UA Negative Negative    Blood, UA Small (1+) (A) Negative    Protein, UA 30 mg/dL (1+) (A) Negative    Leuk Esterase, UA Negative Negative    Nitrite, UA Negative Negative    Urobilinogen, UA 0.2 E.U./dL 0.2 - 1.0 E.U./dL   Urinalysis, Microscopic Only - Urine, Clean Catch    Collection Time: 09/21/22  1:59 PM    Specimen: Urine, Clean Catch   Result Value Ref Range    RBC, UA 0-2 None Seen, 0-2 /HPF    WBC, UA 0-2 None Seen, 0-2 /HPF    Bacteria, UA None Seen None Seen /HPF    Squamous Epithelial Cells, UA 3-6 (A) None Seen, 0-2 /HPF    Hyaline Casts, UA 0-2 None Seen /LPF    Methodology Manual Light Microscopy          Review of Systems   HENT: Positive for congestion, sore throat and trouble swallowing. Negative for drooling, ear discharge, ear pain and swollen glands.    Respiratory: Positive for cough and stridor.    Gastrointestinal: Negative for abdominal pain, diarrhea and vomiting.   Musculoskeletal: Negative for neck pain.       Objective     /90   Pulse 98   Temp 99.6 °F (37.6 °C) (Oral)   Ht 157.5 cm (62\")   Wt 92.5 kg (204 lb)   SpO2 94%   BMI 37.31 kg/m²     Physical Exam  Vitals and nursing note reviewed.   Constitutional:       Appearance: She is obese.   HENT:      Head: Normocephalic.      Right Ear: External ear normal.      Left Ear: External ear normal.      Nose: Nose normal.      Mouth/Throat:      Mouth: Mucous membranes are moist.   Eyes:      Pupils: Pupils are equal, round, and reactive to light.   Cardiovascular:      Rate and Rhythm: Normal rate and regular rhythm.      Pulses: Normal pulses.      Heart sounds: Normal heart sounds.   Pulmonary:      Effort: No accessory muscle usage, prolonged expiration or respiratory distress.      Breath sounds: No stridor or transmitted upper airway sounds. Examination of the right-upper field reveals wheezing. Examination of the left-upper field reveals " wheezing. Examination of the right-lower field reveals wheezing. Examination of the left-lower field reveals wheezing. Wheezing and rales present.       Abdominal:      General: Bowel sounds are normal.      Palpations: Abdomen is soft.   Skin:     General: Skin is warm and dry.   Neurological:      General: No focal deficit present.      Mental Status: She is alert and oriented to person, place, and time.   Psychiatric:         Mood and Affect: Mood normal.         Result Review :                Assessment & Plan    Diagnoses and all orders for this visit:    1. Chronic obstructive pulmonary disease, unspecified COPD type (HCC) (Primary)  Comments:  steroids and nebulizer.    2. Primary hypertension    Other orders  -     predniSONE (DELTASONE) 10 MG tablet; Take 4 tablets by mouth Daily for 2 days, THEN 3 tablets Daily for 2 days, THEN 2 tablets Daily for 2 days, THEN 1 tablet Daily for 2 days.  Dispense: 20 tablet; Refill: 0  -     ipratropium-albuterol (DUO-NEB) 0.5-2.5 mg/3 ml nebulizer; Take 3 mL by nebulization Every 4 (Four) Hours As Needed for Wheezing.  Dispense: 360 mL; Refill: 0  -     cefdinir (OMNICEF) 300 MG capsule; Take 1 capsule by mouth 2 (Two) Times a Day.  Dispense: 14 capsule; Refill: 0      Patient Instructions   Take all antibiotics  If more short or air trouble breathing to to the ED  Use nebulizer every 4-6 hrs for 2 days then as needed  Fluids mucinex.  Gargle spray throat.  Keep sinus cleaned out  Take steroids in am with food.        Follow Up   Return in about 1 week (around 12/29/2022).    Patient was given instructions and counseling regarding her condition or for health maintenance advice. Please see specific information pulled into the AVS if appropriate.     Cheryl Perez, APRN    12/22/22      Answers for HPI/ROS submitted by the patient on 12/21/2022  What is the primary reason for your visit?: Sore Throat

## 2022-12-22 NOTE — PATIENT INSTRUCTIONS
Take all antibiotics  If more short or air trouble breathing to to the ED  Use nebulizer every 4-6 hrs for 2 days then as needed  Fluids mucinex.  Gargle spray throat.  Keep sinus cleaned out  Take steroids in am with food.

## 2022-12-23 ENCOUNTER — DOCUMENTATION (OUTPATIENT)
Dept: FAMILY MEDICINE CLINIC | Facility: CLINIC | Age: 67
End: 2022-12-23

## 2022-12-23 RX ORDER — IPRATROPIUM BROMIDE AND ALBUTEROL SULFATE 2.5; .5 MG/3ML; MG/3ML
3 SOLUTION RESPIRATORY (INHALATION) EVERY 4 HOURS PRN
Qty: 360 ML | Refills: 0 | Status: SHIPPED | OUTPATIENT
Start: 2022-12-23

## 2022-12-30 ENCOUNTER — OFFICE VISIT (OUTPATIENT)
Dept: FAMILY MEDICINE CLINIC | Facility: CLINIC | Age: 67
End: 2022-12-30
Payer: MEDICARE

## 2022-12-30 VITALS
HEART RATE: 99 BPM | TEMPERATURE: 97.8 F | HEIGHT: 62 IN | SYSTOLIC BLOOD PRESSURE: 126 MMHG | BODY MASS INDEX: 37.73 KG/M2 | DIASTOLIC BLOOD PRESSURE: 76 MMHG | OXYGEN SATURATION: 93 % | WEIGHT: 205 LBS

## 2022-12-30 DIAGNOSIS — J44.1 COPD WITH EXACERBATION: Primary | ICD-10-CM

## 2022-12-30 PROCEDURE — 1159F MED LIST DOCD IN RCRD: CPT | Performed by: NURSE PRACTITIONER

## 2022-12-30 PROCEDURE — 99213 OFFICE O/P EST LOW 20 MIN: CPT | Performed by: NURSE PRACTITIONER

## 2022-12-30 RX ORDER — PREDNISONE 20 MG/1
20 TABLET ORAL DAILY
Qty: 7 TABLET | Refills: 0 | Status: SHIPPED | OUTPATIENT
Start: 2022-12-30 | End: 2023-03-16

## 2022-12-30 RX ORDER — FLUCONAZOLE 150 MG/1
TABLET ORAL
COMMUNITY
Start: 2022-12-29 | End: 2023-03-16 | Stop reason: ALTCHOICE

## 2022-12-30 RX ORDER — CLOTRIMAZOLE 10 MG/1
10 LOZENGE ORAL; TOPICAL
Qty: 35 TABLET | Refills: 0 | Status: SHIPPED | OUTPATIENT
Start: 2022-12-30 | End: 2023-03-16

## 2022-12-30 NOTE — PROGRESS NOTES
Subjective        Quynh Du is a 67 y.o. female.     Chief Complaint   Patient presents with   • COPD     1 week f/u       History of Present Illness  Patient is here for follow up on her COPD. She has cough and she has laryngitis throat is sore.     Copd: using duo nebs tid and her albuterol. She completed antibiotics and steroids. She is taking elderberry was instructed to stop taking this.   She is using mucinex. Fluids.     Hoarse voice : discussed thrush . Gave her mycelex marcella.        The following portions of the patient's history were reviewed and updated as appropriate: allergies, current medications, past family history, past medical history, past social history, past surgical history and problem list.      Current Outpatient Medications:   •  albuterol sulfate  (90 Base) MCG/ACT inhaler, Inhale 2 puffs Every 4 (Four) Hours As Needed for Wheezing., Disp: 18 g, Rfl: 3  •  aspirin (aspirin) 81 MG EC tablet, Take 1 tablet by mouth Daily., Disp: 90 tablet, Rfl: 1  •  atorvastatin (LIPITOR) 20 MG tablet, Take 1 tablet by mouth Daily., Disp: 90 tablet, Rfl: 0  •  ipratropium-albuterol (DUO-NEB) 0.5-2.5 mg/3 ml nebulizer, Take 3 mL by nebulization Every 4 (Four) Hours As Needed for Wheezing., Disp: 360 mL, Rfl: 0  •  lisinopril (PRINIVIL,ZESTRIL) 10 MG tablet, Take 1 tablet by mouth Daily., Disp: 90 tablet, Rfl: 1  •  cefdinir (OMNICEF) 300 MG capsule, Take 1 capsule by mouth 2 (Two) Times a Day., Disp: 14 capsule, Rfl: 0  •  clotrimazole (MYCELEX) 10 MG marcella, Take 1 tablet by mouth 5 (Five) Times a Day., Disp: 35 tablet, Rfl: 0  •  fluconazole (DIFLUCAN) 150 MG tablet, , Disp: , Rfl:   •  predniSONE (DELTASONE) 20 MG tablet, Take 1 tablet by mouth Daily., Disp: 7 tablet, Rfl: 0    Recent Results (from the past 4032 hour(s))   Urinalysis With Culture If Indicated - Urine, Clean Catch    Collection Time: 09/09/22 10:30 AM    Specimen: Urine, Clean Catch   Result Value Ref Range    Color, UA Dark  Yellow (A) Yellow, Straw    Appearance, UA Turbid (A) Clear    pH, UA 5.5 5.0 - 8.0    Specific Gravity, UA 1.023 1.005 - 1.030    Glucose, UA Negative Negative    Ketones, UA Trace (A) Negative    Bilirubin, UA Negative Negative    Blood, UA Large (3+) (A) Negative    Protein, UA >=300 mg/dL (3+) (A) Negative    Leuk Esterase, UA Small (1+) (A) Negative    Nitrite, UA Negative Negative    Urobilinogen, UA 0.2 E.U./dL 0.2 - 1.0 E.U./dL   Urinalysis, Microscopic Only - Urine, Clean Catch    Collection Time: 09/09/22 10:30 AM    Specimen: Urine, Clean Catch   Result Value Ref Range    RBC, UA Too Numerous to Count (A) None Seen, 0-2 /HPF    WBC, UA 3-5 (A) None Seen, 0-2 /HPF    Bacteria, UA 4+ (A) None Seen /HPF    Squamous Epithelial Cells, UA 0-2 None Seen, 0-2 /HPF    Hyaline Casts, UA 0-2 None Seen /LPF    Calcium Oxalate Crystals, UA Large/3+ None Seen /HPF    Methodology Manual Light Microscopy    POCT urinalysis dipstick, automated    Collection Time: 09/09/22 11:02 AM    Specimen: Urine   Result Value Ref Range    Color Other (A) Yellow, Straw, Dark Yellow, Dang    Clarity, UA Cloudy (A) Clear    Specific Gravity  1.030 1.005 - 1.030    pH, Urine 5.5 5.0 - 8.0    Leukocytes Negative Negative    Nitrite, UA Negative Negative    Protein,  mg/dL (A) Negative mg/dL    Glucose, UA Negative Negative mg/dL    Ketones, UA Negative Negative    Urobilinogen, UA 0.2 E.U./dL Normal, 0.2 E.U./dL    Bilirubin Negative Negative    Blood, UA Large (A) Negative    Lot Number [id number]     Expiration Date 2,282,023    Urinalysis With Culture If Indicated - Urine, Clean Catch    Collection Time: 09/21/22  1:59 PM    Specimen: Urine, Clean Catch   Result Value Ref Range    Color, UA Yellow Yellow, Straw    Appearance, UA Hazy (A) Clear    pH, UA 6.0 5.0 - 8.0    Specific Gravity, UA >=1.030 1.005 - 1.030    Glucose, UA Negative Negative    Ketones, UA Trace (A) Negative    Bilirubin, UA Negative Negative    Blood, UA  Small (1+) (A) Negative    Protein, UA 30 mg/dL (1+) (A) Negative    Leuk Esterase, UA Negative Negative    Nitrite, UA Negative Negative    Urobilinogen, UA 0.2 E.U./dL 0.2 - 1.0 E.U./dL   Urinalysis, Microscopic Only - Urine, Clean Catch    Collection Time: 09/21/22  1:59 PM    Specimen: Urine, Clean Catch   Result Value Ref Range    RBC, UA 0-2 None Seen, 0-2 /HPF    WBC, UA 0-2 None Seen, 0-2 /HPF    Bacteria, UA None Seen None Seen /HPF    Squamous Epithelial Cells, UA 3-6 (A) None Seen, 0-2 /HPF    Hyaline Casts, UA 0-2 None Seen /LPF    Methodology Manual Light Microscopy    POCT rapid strep A    Collection Time: 12/22/22  9:47 AM    Specimen: Swab   Result Value Ref Range    Rapid Strep A Screen Negative Negative, VALID, INVALID, Not Performed    Internal Control Passed Passed    Lot Number [id number]     Expiration Date 10,312,023    POCT SARS-CoV-2 Antigen ROXY + Flu    Collection Time: 12/22/22  9:47 AM    Specimen: Swab   Result Value Ref Range    SARS Antigen Not Detected Not Detected, Presumptive Negative    Influenza A Antigen ROXY Not Detected Not Detected    Influenza B Antigen ROXY Not Detected Not Detected    Internal Control Passed Passed    Lot Number [id number]     Expiration Date 1,172,023          Review of Systems    Objective     /76   Pulse 99   Temp 97.8 °F (36.6 °C) (Infrared)   Ht 157.5 cm (62\")   Wt 93 kg (205 lb)   SpO2 93%   BMI 37.49 kg/m²     Physical Exam  Vitals and nursing note reviewed.   Constitutional:       Appearance: She is obese.   HENT:      Head: Normocephalic.      Right Ear: External ear normal.      Left Ear: External ear normal.   Cardiovascular:      Pulses: Normal pulses.   Pulmonary:      Breath sounds: No decreased air movement.      Comments: Mild rhonchi   No retractions no nasal flare  No rales.     Neurological:      Mental Status: She is alert.         Result Review :                Assessment & Plan    Diagnoses and all orders for this visit:    1.  COPD with exacerbation (HCC) (Primary)  Comments:  sreroids for next 7 days once day. continiue breathing treatment.    Other orders  -     clotrimazole (MYCELEX) 10 MG marcella; Take 1 tablet by mouth 5 (Five) Times a Day.  Dispense: 35 tablet; Refill: 0  -     predniSONE (DELTASONE) 20 MG tablet; Take 1 tablet by mouth Daily.  Dispense: 7 tablet; Refill: 0      Patient Instructions   Continue mucinex  Eat cottage cheese or yogart.  Use the nebulizer at home not the inhaler use the inhaler when out.  Take the prednisone in am with food for 7 days   Use the mycelex 5 times a day for one week.   If fever short of air go to the ED        Follow Up   Return in about 2 weeks (around 1/13/2023).    Patient was given instructions and counseling regarding her condition or for health maintenance advice. Please see specific information pulled into the AVS if appropriate.     Cheryl Perez, APRN    01/01/23

## 2022-12-30 NOTE — PATIENT INSTRUCTIONS
Continue mucinex  Eat cottage cheese or yogart.  Use the nebulizer at home not the inhaler use the inhaler when out.  Take the prednisone in am with food for 7 days   Use the mycelex 5 times a day for one week.   If fever short of air go to the ED

## 2023-03-01 RX ORDER — ATORVASTATIN CALCIUM 20 MG/1
20 TABLET, FILM COATED ORAL DAILY
Qty: 90 TABLET | Refills: 0 | Status: SHIPPED | OUTPATIENT
Start: 2023-03-01

## 2023-03-16 ENCOUNTER — OFFICE VISIT (OUTPATIENT)
Dept: FAMILY MEDICINE CLINIC | Facility: CLINIC | Age: 68
End: 2023-03-16
Payer: MEDICARE

## 2023-03-16 VITALS
WEIGHT: 209 LBS | DIASTOLIC BLOOD PRESSURE: 80 MMHG | OXYGEN SATURATION: 94 % | TEMPERATURE: 98.3 F | SYSTOLIC BLOOD PRESSURE: 140 MMHG | BODY MASS INDEX: 38.46 KG/M2 | HEIGHT: 62 IN | HEART RATE: 91 BPM

## 2023-03-16 DIAGNOSIS — Z11.59 NEED FOR HEPATITIS C SCREENING TEST: ICD-10-CM

## 2023-03-16 DIAGNOSIS — E78.2 MIXED HYPERLIPIDEMIA: ICD-10-CM

## 2023-03-16 DIAGNOSIS — I10 PRIMARY HYPERTENSION: Chronic | ICD-10-CM

## 2023-03-16 DIAGNOSIS — J44.1 COPD WITH EXACERBATION: Primary | ICD-10-CM

## 2023-03-16 DIAGNOSIS — L12.0 BULLOUS PEMPHIGOID: Chronic | ICD-10-CM

## 2023-03-16 PROBLEM — R23.8 BULLOUS LESION: Status: ACTIVE | Noted: 2023-03-16

## 2023-03-16 PROCEDURE — 90677 PCV20 VACCINE IM: CPT | Performed by: NURSE PRACTITIONER

## 2023-03-16 PROCEDURE — 3077F SYST BP >= 140 MM HG: CPT | Performed by: NURSE PRACTITIONER

## 2023-03-16 PROCEDURE — 99214 OFFICE O/P EST MOD 30 MIN: CPT | Performed by: NURSE PRACTITIONER

## 2023-03-16 PROCEDURE — 3079F DIAST BP 80-89 MM HG: CPT | Performed by: NURSE PRACTITIONER

## 2023-03-16 PROCEDURE — G0009 ADMIN PNEUMOCOCCAL VACCINE: HCPCS | Performed by: NURSE PRACTITIONER

## 2023-03-16 NOTE — PROGRESS NOTES
Subjective        Quynh Du is a 67 y.o. female.     Chief Complaint   Patient presents with   • COPD   • Hyperlipidemia   • Hypertension     6 month f/u   • Mass     Lumps bilateral elbows       History of Present Illness  Patient is here for management of her chronic medical problems: copd , hypertension, hyperlipidemia, new problem lumps on elbows.     She is getting blisters on elbows and had one top of right leg. Elbows happened 2 times says blisters filled with purulent drng. She saw dermatology and they had no idea what was.   Has healing area left elbow now and right elbow with scabbed lesion. No fever .   No injury she reports.   Discussed meds, autoimmune, sun reaction.    Copd using albuterol once day. She has neb not using much.  She has asthma. No recent flare. Not exposed second hand smoke. She quit smoking 13 years ago.     Hyperlipidemia atorvastatin 20 mg daily.     Hypertension taking lisinopril 10 mg daily.            Hypertension: lisinopril 10 mg she is checking and brought device today . avg at home is 124/81  The following portions of the patient's history were reviewed and updated as appropriate: allergies, current medications, past family history, past medical history, past social history, past surgical history and problem list.      Current Outpatient Medications:   •  albuterol sulfate  (90 Base) MCG/ACT inhaler, Inhale 2 puffs Every 4 (Four) Hours As Needed for Wheezing., Disp: 18 g, Rfl: 3  •  aspirin (aspirin) 81 MG EC tablet, Take 1 tablet by mouth Daily., Disp: 90 tablet, Rfl: 1  •  atorvastatin (LIPITOR) 20 MG tablet, Take 1 tablet by mouth Daily., Disp: 90 tablet, Rfl: 0  •  ipratropium-albuterol (DUO-NEB) 0.5-2.5 mg/3 ml nebulizer, Take 3 mL by nebulization Every 4 (Four) Hours As Needed for Wheezing., Disp: 360 mL, Rfl: 0  •  lisinopril (PRINIVIL,ZESTRIL) 10 MG tablet, Take 1 tablet by mouth Daily., Disp: 90 tablet, Rfl: 1    Recent Results (from the past 4032  "hour(s))   POCT rapid strep A    Collection Time: 12/22/22  9:47 AM    Specimen: Swab   Result Value Ref Range    Rapid Strep A Screen Negative Negative, VALID, INVALID, Not Performed    Internal Control Passed Passed    Lot Number [id number]     Expiration Date 10,312,023    POCT SARS-CoV-2 Antigen ROXY + Flu    Collection Time: 12/22/22  9:47 AM    Specimen: Swab   Result Value Ref Range    SARS Antigen Not Detected Not Detected, Presumptive Negative    Influenza A Antigen ROXY Not Detected Not Detected    Influenza B Antigen ROXY Not Detected Not Detected    Internal Control Passed Passed    Lot Number [id number]     Expiration Date 1,172,023          Review of Systems    Objective     /80   Pulse 91   Temp 98.3 °F (36.8 °C) (Infrared)   Ht 157.5 cm (62\")   Wt 94.8 kg (209 lb)   SpO2 94%   BMI 38.23 kg/m²     Physical Exam  Skin:     General: Skin is warm and dry.             Comments: Left elbow with healing pink lesion  Right elbow scabbed lesion         Result Review :                Assessment & Plan    Diagnoses and all orders for this visit:    1. COPD with exacerbation (HCC) (Primary)  -     Pulmonary Function Test; Future    2. Bullous pemphigoid  Comments:  stable discussed reasons for rash. autimmune, medications, sunlight. will monitor    3. Mixed hyperlipidemia  -     Lipid Panel; Future  -     Comprehensive Metabolic Panel; Future    4. Primary hypertension  Comments:  checked her wrist device against manual check  stable    5. Need for hepatitis C screening test  -     Hepatitis C antibody; Future    Other orders  -     Pneumococcal Conjugate Vaccine 20-Valent (PCV20)      Patient Instructions   Follow up on labs.        Follow Up   Return in about 6 months (around 9/16/2023).    Patient was given instructions and counseling regarding her condition or for health maintenance advice. Please see specific information pulled into the AVS if appropriate.     Cheryl Perez, " APRN    03/16/23      Answers for HPI/ROS submitted by the patient on 3/6/2023  Please describe your symptoms.: No symptoms, just a follow up visit  Have you had these symptoms before?: No  How long have you been having these symptoms?: 1-4 days  What is the primary reason for your visit?: Other

## 2023-04-24 ENCOUNTER — HOSPITAL ENCOUNTER (OUTPATIENT)
Dept: RESPIRATORY THERAPY | Facility: HOSPITAL | Age: 68
Discharge: HOME | End: 2023-04-24
Payer: MEDICARE

## 2023-04-24 ENCOUNTER — LAB (OUTPATIENT)
Dept: LAB | Facility: HOSPITAL | Age: 68
End: 2023-04-24
Payer: MEDICARE

## 2023-04-24 VITALS — RESPIRATION RATE: 16 BRPM | HEART RATE: 88 BPM

## 2023-04-24 DIAGNOSIS — E78.2 MIXED HYPERLIPIDEMIA: ICD-10-CM

## 2023-04-24 DIAGNOSIS — J44.1 COPD WITH EXACERBATION: ICD-10-CM

## 2023-04-24 DIAGNOSIS — Z11.59 NEED FOR HEPATITIS C SCREENING TEST: ICD-10-CM

## 2023-04-24 LAB
ALBUMIN SERPL-MCNC: 4 G/DL (ref 3.5–5.2)
ALBUMIN/GLOB SERPL: 1.2 G/DL
ALP SERPL-CCNC: 132 U/L (ref 39–117)
ALT SERPL W P-5'-P-CCNC: 36 U/L (ref 1–33)
ANION GAP SERPL CALCULATED.3IONS-SCNC: 8.5 MMOL/L (ref 5–15)
AST SERPL-CCNC: 29 U/L (ref 1–32)
BILIRUB SERPL-MCNC: 0.5 MG/DL (ref 0–1.2)
BUN SERPL-MCNC: 13 MG/DL (ref 8–23)
BUN/CREAT SERPL: 16.9 (ref 7–25)
CALCIUM SPEC-SCNC: 9.6 MG/DL (ref 8.6–10.5)
CHLORIDE SERPL-SCNC: 103 MMOL/L (ref 98–107)
CHOLEST SERPL-MCNC: 111 MG/DL (ref 0–200)
CO2 SERPL-SCNC: 30.5 MMOL/L (ref 22–29)
CREAT SERPL-MCNC: 0.77 MG/DL (ref 0.57–1)
EGFRCR SERPLBLD CKD-EPI 2021: 84.1 ML/MIN/1.73
GLOBULIN UR ELPH-MCNC: 3.3 GM/DL
GLUCOSE SERPL-MCNC: 126 MG/DL (ref 65–99)
HCV AB SER DONR QL: NORMAL
HDLC SERPL-MCNC: 47 MG/DL (ref 40–60)
LDLC SERPL CALC-MCNC: 47 MG/DL (ref 0–100)
LDLC/HDLC SERPL: 0.99 {RATIO}
POTASSIUM SERPL-SCNC: 4.2 MMOL/L (ref 3.5–5.2)
PROT SERPL-MCNC: 7.3 G/DL (ref 6–8.5)
SODIUM SERPL-SCNC: 142 MMOL/L (ref 136–145)
TRIGL SERPL-MCNC: 87 MG/DL (ref 0–150)
VLDLC SERPL-MCNC: 17 MG/DL (ref 5–40)

## 2023-04-24 PROCEDURE — 36415 COLL VENOUS BLD VENIPUNCTURE: CPT

## 2023-04-24 PROCEDURE — 80061 LIPID PANEL: CPT

## 2023-04-24 PROCEDURE — 94727 GAS DIL/WSHOT DETER LNG VOL: CPT

## 2023-04-24 PROCEDURE — 94060 EVALUATION OF WHEEZING: CPT

## 2023-04-24 PROCEDURE — 86803 HEPATITIS C AB TEST: CPT

## 2023-04-24 PROCEDURE — 94729 DIFFUSING CAPACITY: CPT

## 2023-04-24 PROCEDURE — 80053 COMPREHEN METABOLIC PANEL: CPT

## 2023-04-24 PROCEDURE — 94664 DEMO&/EVAL PT USE INHALER: CPT

## 2023-04-24 RX ORDER — ALBUTEROL SULFATE 90 UG/1
2 AEROSOL, METERED RESPIRATORY (INHALATION) ONCE
Status: COMPLETED | OUTPATIENT
Start: 2023-04-24 | End: 2023-04-24

## 2023-04-24 RX ADMIN — ALBUTEROL SULFATE 2 PUFF: 108 INHALANT RESPIRATORY (INHALATION) at 13:21

## 2023-04-25 RX ORDER — BUDESONIDE AND FORMOTEROL FUMARATE DIHYDRATE 160; 4.5 UG/1; UG/1
2 AEROSOL RESPIRATORY (INHALATION)
Qty: 10.2 G | Refills: 12 | Status: SHIPPED | OUTPATIENT
Start: 2023-04-25 | End: 2023-04-27

## 2023-04-26 RX ORDER — ASPIRIN 81 MG/1
81 TABLET ORAL DAILY
Qty: 90 TABLET | Refills: 1 | Status: SHIPPED | OUTPATIENT
Start: 2023-04-26

## 2023-04-26 RX ORDER — LISINOPRIL 10 MG/1
10 TABLET ORAL DAILY
Qty: 90 TABLET | Refills: 1 | Status: SHIPPED | OUTPATIENT
Start: 2023-04-26

## 2023-04-27 RX ORDER — FLUTICASONE PROPIONATE AND SALMETEROL 250; 50 UG/1; UG/1
1 POWDER RESPIRATORY (INHALATION)
Qty: 60 EACH | Refills: 6 | Status: SHIPPED | OUTPATIENT
Start: 2023-04-27

## 2023-05-30 ENCOUNTER — TELEPHONE (OUTPATIENT)
Dept: FAMILY MEDICINE CLINIC | Facility: CLINIC | Age: 68
End: 2023-05-30

## 2023-05-30 RX ORDER — ATORVASTATIN CALCIUM 20 MG/1
20 TABLET, FILM COATED ORAL DAILY
Qty: 90 TABLET | Refills: 0 | Status: SHIPPED | OUTPATIENT
Start: 2023-05-30

## 2023-09-05 RX ORDER — ATORVASTATIN CALCIUM 20 MG/1
20 TABLET, FILM COATED ORAL DAILY
Qty: 90 TABLET | Refills: 0 | Status: SHIPPED | OUTPATIENT
Start: 2023-09-05

## 2023-09-05 RX ORDER — ALBUTEROL SULFATE 90 UG/1
2 AEROSOL, METERED RESPIRATORY (INHALATION) EVERY 4 HOURS PRN
Qty: 18 G | Refills: 3 | Status: SHIPPED | OUTPATIENT
Start: 2023-09-05

## 2023-09-13 ENCOUNTER — OFFICE VISIT (OUTPATIENT)
Dept: FAMILY MEDICINE CLINIC | Facility: CLINIC | Age: 68
End: 2023-09-13
Payer: MEDICARE

## 2023-09-13 VITALS
HEART RATE: 97 BPM | SYSTOLIC BLOOD PRESSURE: 130 MMHG | OXYGEN SATURATION: 93 % | BODY MASS INDEX: 36.5 KG/M2 | DIASTOLIC BLOOD PRESSURE: 74 MMHG | HEIGHT: 63 IN | TEMPERATURE: 98.1 F | WEIGHT: 206 LBS

## 2023-09-13 DIAGNOSIS — R23.8 BULLOUS LESION: ICD-10-CM

## 2023-09-13 DIAGNOSIS — Z00.00 MEDICARE ANNUAL WELLNESS VISIT, SUBSEQUENT: Primary | ICD-10-CM

## 2023-09-13 DIAGNOSIS — E78.2 MIXED HYPERLIPIDEMIA: Chronic | ICD-10-CM

## 2023-09-13 DIAGNOSIS — I10 PRIMARY HYPERTENSION: Chronic | ICD-10-CM

## 2023-09-13 DIAGNOSIS — I25.9 ISCHEMIA OF HEART, CHRONIC: Chronic | ICD-10-CM

## 2023-09-13 DIAGNOSIS — J44.9 CHRONIC OBSTRUCTIVE PULMONARY DISEASE, UNSPECIFIED COPD TYPE: Chronic | ICD-10-CM

## 2023-09-13 DIAGNOSIS — E66.01 SEVERE OBESITY WITH BODY MASS INDEX (BMI) OF 36.0 TO 36.9 WITH SERIOUS COMORBIDITY: Chronic | ICD-10-CM

## 2023-09-13 PROBLEM — T50.905A: Status: ACTIVE | Noted: 2023-03-16

## 2023-09-13 PROBLEM — L12.0: Status: RESOLVED | Noted: 2023-03-16 | Resolved: 2023-09-13

## 2023-09-13 PROBLEM — T50.905A: Status: RESOLVED | Noted: 2023-03-16 | Resolved: 2023-09-13

## 2023-09-13 RX ORDER — UBIDECARENONE 75 MG
50 CAPSULE ORAL DAILY
COMMUNITY

## 2023-09-13 NOTE — PATIENT INSTRUCTIONS
You should hear from pulmonology  Eat healthy   Join [employer]  Use duo nebs.   Walk as toleratedRSV vaccine, flu vaccine covid booster

## 2023-09-13 NOTE — PROGRESS NOTES
Subjective        Quynh Du is a 67 y.o. female.     Chief Complaint   Patient presents with    Medicare Wellness-subsequent     Sub [facility]    COPD     6 month f/u       History of Present Illness    The following portions of the patient's history were reviewed and updated as appropriate: allergies, current medications, past family history, past medical history, past social history, past surgical history and problem list.      Current Outpatient Medications:     albuterol sulfate  (90 Base) MCG/ACT inhaler, Inhale 2 puffs Every 4 (Four) Hours As Needed for Wheezing., Disp: 18 g, Rfl: 3    aspirin 81 MG EC tablet, Take 1 tablet by mouth Daily., Disp: 90 tablet, Rfl: 1    atorvastatin (LIPITOR) 20 MG tablet, Take 1 tablet by mouth Daily., Disp: 90 tablet, Rfl: 0    Fluticasone-Salmeterol (ADVAIR/WIXELA) 250-50 MCG/ACT DISKUS, Inhale 1 puff 2 (Two) Times a Day., Disp: 60 each, Rfl: 6    ipratropium-albuterol (DUO-NEB) 0.5-2.5 mg/3 ml nebulizer, Take 3 mL by nebulization Every 4 (Four) Hours As Needed for Wheezing., Disp: 360 mL, Rfl: 0    lisinopril (PRINIVIL,ZESTRIL) 10 MG tablet, Take 1 tablet by mouth Daily., Disp: 90 tablet, Rfl: 1    vitamin B-12 (CYANOCOBALAMIN) 100 MCG tablet, Take 0.5 tablets by mouth Daily., Disp: , Rfl:     Recent Results (from the past 4032 hour(s))   Lipid Panel    Collection Time: 04/24/23  2:08 PM    Specimen: Blood   Result Value Ref Range    Total Cholesterol 111 0 - 200 mg/dL    Triglycerides 87 0 - 150 mg/dL    HDL Cholesterol 47 40 - 60 mg/dL    LDL Cholesterol  47 0 - 100 mg/dL    VLDL Cholesterol 17 5 - 40 mg/dL    LDL/HDL Ratio 0.99    Comprehensive Metabolic Panel    Collection Time: 04/24/23  2:08 PM    Specimen: Blood   Result Value Ref Range    Glucose 126 (H) 65 - 99 mg/dL    BUN 13 8 - 23 mg/dL    Creatinine 0.77 0.57 - 1.00 mg/dL    Sodium 142 136 - 145 mmol/L    Potassium 4.2 3.5 - 5.2 mmol/L    Chloride 103 98 - 107 mmol/L    CO2 30.5 (H) 22.0 - 29.0 mmol/L     "Calcium 9.6 8.6 - 10.5 mg/dL    Total Protein 7.3 6.0 - 8.5 g/dL    Albumin 4.0 3.5 - 5.2 g/dL    ALT (SGPT) 36 (H) 1 - 33 U/L    AST (SGOT) 29 1 - 32 U/L    Alkaline Phosphatase 132 (H) 39 - 117 U/L    Total Bilirubin 0.5 0.0 - 1.2 mg/dL    Globulin 3.3 gm/dL    A/G Ratio 1.2 g/dL    BUN/Creatinine Ratio 16.9 7.0 - 25.0    Anion Gap 8.5 5.0 - 15.0 mmol/L    eGFR 84.1 >60.0 mL/min/1.73   Hepatitis C antibody    Collection Time: 04/24/23  2:08 PM    Specimen: Blood   Result Value Ref Range    Hepatitis C Ab Non-Reactive Non-Reactive         Review of Systems    Objective     Pulse 97   Temp 98.1 °F (36.7 °C) (Infrared)   Ht 160 cm (63\")   Wt 93.4 kg (206 lb)   SpO2 93%   BMI 36.49 kg/m²     Physical Exam    Result Review :                Assessment & Plan    There are no diagnoses linked to this encounter.  There are no Patient Instructions on file for this visit.    Follow Up   No follow-ups on file.    Patient was given instructions and counseling regarding her condition or for health maintenance advice. Please see specific information pulled into the AVS if appropriate.     Cheryl Perez, APRN    09/13/23      "

## 2023-09-13 NOTE — ASSESSMENT & PLAN NOTE
Patient's (Body mass index is 36.49 kg/m².) indicates that they are obese (BMI >30) with health conditions that include hypertension, coronary heart disease, and dyslipidemias . Weight is improving with lifestyle modifications. BMI  is above average; BMI management plan is completed. We discussed portion control, increasing exercise, and joining a fitness center or start home based exercise program.

## 2023-09-13 NOTE — PROGRESS NOTES
The ABCs of the Annual Wellness Visit  Subsequent Medicare Wellness Visit    Subjective    Quynh Du is a 67 y.o. female who presents for a Subsequent Medicare Wellness Visit.    The following portions of the patient's history were reviewed and   updated as appropriate: allergies, current medications, past family history, past medical history, past social history, past surgical history, and problem list.    Compared to one year ago, the patient feels her physical   health is the same.    Compared to one year ago, the patient feels her mental   health is the same.    Recent Hospitalizations:  She was not admitted to the hospital during the last year.       Current Medical Providers:  Patient Care Team:  Cheryl Perez APRN as PCP - General (Nurse Practitioner)  Lalo Coleman MD as Consulting Physician (Cardiology)    Outpatient Medications Prior to Visit   Medication Sig Dispense Refill    albuterol sulfate  (90 Base) MCG/ACT inhaler Inhale 2 puffs Every 4 (Four) Hours As Needed for Wheezing. 18 g 3    aspirin 81 MG EC tablet Take 1 tablet by mouth Daily. 90 tablet 1    atorvastatin (LIPITOR) 20 MG tablet Take 1 tablet by mouth Daily. 90 tablet 0    ipratropium-albuterol (DUO-NEB) 0.5-2.5 mg/3 ml nebulizer Take 3 mL by nebulization Every 4 (Four) Hours As Needed for Wheezing. 360 mL 0    lisinopril (PRINIVIL,ZESTRIL) 10 MG tablet Take 1 tablet by mouth Daily. 90 tablet 1    vitamin B-12 (CYANOCOBALAMIN) 100 MCG tablet Take 0.5 tablets by mouth Daily.      Fluticasone-Salmeterol (ADVAIR/WIXELA) 250-50 MCG/ACT DISKUS Inhale 1 puff 2 (Two) Times a Day. 60 each 6     No facility-administered medications prior to visit.       No opioid medication identified on active medication list. I have reviewed chart for other potential  high risk medication/s and harmful drug interactions in the elderly.        Aspirin is on active medication list. Aspirin use is indicated based on review of current medical  "condition/s. Pros and cons of this therapy have been discussed today. Benefits of this medication outweigh potential harm.  Patient has been encouraged to continue taking this medication.  .      Patient Active Problem List   Diagnosis    COPD (chronic obstructive pulmonary disease)    Depression    Hematuria    Severe obesity with body mass index (BMI) of 36.0 to 36.9 with serious comorbidity    Medicare annual wellness visit, subsequent    Dyspnea    Secondary hypertension    Mixed hyperlipidemia    Ischemia of heart, chronic    Angina pectoris    Pure hypercholesterolemia    Primary hypertension    Abnormal nuclear stress test    Bullous lesion     Advance Care Planning   Advance Care Planning     Advance Directive is not on file.  ACP discussion was held with the patient during this visit. Patient has an advance directive (not in EMR), copy requested.     Objective    Vitals:    23 0944 23 1015   BP:  130/74   Pulse: 97    Temp: 98.1 °F (36.7 °C)    TempSrc: Infrared    SpO2: 93%    Weight: 93.4 kg (206 lb)    Height: 160 cm (63\")    PainSc: 0-No pain      Estimated body mass index is 36.49 kg/m² as calculated from the following:    Height as of this encounter: 160 cm (63\").    Weight as of this encounter: 93.4 kg (206 lb).    Class 2 Severe Obesity (BMI >=35 and <=39.9). Obesity-related health conditions include the following: hypertension and dyslipidemias. Obesity is improving with lifestyle modifications. BMI is is above average; BMI management plan is completed. We discussed portion control, increasing exercise, and joining a fitness center or start home based exercise program.      Does the patient have evidence of cognitive impairment? No          HEALTH RISK ASSESSMENT    Smoking Status:  Social History     Tobacco Use   Smoking Status Former    Packs/day: 0.50    Years: 15.00    Pack years: 7.50    Types: Cigarettes    Quit date: 2009    Years since quittin.1   Smokeless Tobacco " Never     Alcohol Consumption:  Social History     Substance and Sexual Activity   Alcohol Use Not Currently    Comment: socially     Fall Risk Screen:    LUIZ Fall Risk Assessment was completed, and patient is at LOW risk for falls.Assessment completed on:2023    Depression Screenin/13/2023     9:46 AM   PHQ-2/PHQ-9 Depression Screening   Little Interest or Pleasure in Doing Things 0-->not at all   Feeling Down, Depressed or Hopeless 0-->not at all   PHQ-9: Brief Depression Severity Measure Score 0       Health Habits and Functional and Cognitive Screenin/13/2023     9:46 AM   Functional & Cognitive Status   Do you have difficulty preparing food and eating? No   Do you have difficulty bathing yourself, getting dressed or grooming yourself? No   Do you have difficulty using the toilet? No   Do you have difficulty moving around from place to place? No   Do you have trouble with steps or getting out of a bed or a chair? No   Current Diet Well Balanced Diet   Dental Exam Not up to date   Eye Exam Not up to date   Exercise (times per week) 3 times per week   Current Exercises Include Walking   Do you need help using the phone?  No   Are you deaf or do you have serious difficulty hearing?  No   Do you need help to go to places out of walking distance? No   Do you need help shopping? No   Do you need help preparing meals?  No   Do you need help with housework?  No   Do you need help with laundry? No   Do you need help taking your medications? No   Do you need help managing money? No   Do you ever drive or ride in a car without wearing a seat belt? No   Have you felt unusual stress, anger or loneliness in the last month? No   Who do you live with? Alone   If you need help, do you have trouble finding someone available to you? No   Have you been bothered in the last four weeks by sexual problems? No   Do you have difficulty concentrating, remembering or making decisions? No       Age-appropriate  Screening Schedule:  Refer to the list below for future screening recommendations based on patient's age, sex and/or medical conditions. Orders for these recommended tests are listed in the plan section. The patient has been provided with a written plan.    Health Maintenance   Topic Date Due    TDAP/TD VACCINES (1 - Tdap) Never done    DXA SCAN  09/13/2023 (Originally 1955)    COLORECTAL CANCER SCREENING  03/14/2024 (Originally 1955)    MAMMOGRAM  09/13/2024 (Originally 1955)    INFLUENZA VACCINE  10/01/2023    LIPID PANEL  04/24/2024    ANNUAL WELLNESS VISIT  09/13/2024    BMI FOLLOWUP  09/13/2024    HEPATITIS C SCREENING  Completed    Pneumococcal Vaccine 65+  Completed    ZOSTER VACCINE  Completed    COVID-19 Vaccine  Discontinued                  CMS Preventative Services Quick Reference  Risk Factors Identified During Encounter  Inactivity/Sedentary: Patient was advised to exercise at least 150 minutes a week per CDC recommendations.  The above risks/problems have been discussed with the patient.  Pertinent information has been shared with the patient in the After Visit Summary.  An After Visit Summary and PPPS were made available to the patient.    Follow Up:   Next Medicare Wellness visit to be scheduled in 1 year.       Additional E&M Note during same encounter follows:  Patient has multiple medical problems which are significant and separately identifiable that require additional work above and beyond the Medicare Wellness Visit.      Chief Complaint  Medicare Wellness-subsequent (Sub mcw) and COPD (6 month f/u)    Subjective        COPD    Quynh Du is also being seen today for hypertension, copd, bullous lesion    New problem bullous lesion started right knee , then left elbow, never had before is using steroid inhale she stopped this and is taking aspirin every other day now see if that helps.     Hypertension ; lisinopril 10 mg daily has hyperlipidemia.     Hyperlipidemia  "atorvastatin 20 mg daily     Chronic ischemia of heart taking aspirin daily has htn and hyperlipidemia.   Stable.     Objective   Vital Signs:  /74   Pulse 97   Temp 98.1 °F (36.7 °C) (Infrared)   Ht 160 cm (63\")   Wt 93.4 kg (206 lb)   SpO2 93%   BMI 36.49 kg/m²     Physical Exam  Vitals and nursing note reviewed.   Constitutional:       Appearance: She is obese.   HENT:      Head: Normocephalic.      Right Ear: External ear normal.      Left Ear: External ear normal.      Nose: Nose normal.      Mouth/Throat:      Mouth: Mucous membranes are moist.   Eyes:      Pupils: Pupils are equal, round, and reactive to light.   Pulmonary:      Effort: Pulmonary effort is normal.      Breath sounds: Normal breath sounds.   Abdominal:      Palpations: Abdomen is soft.   Skin:     General: Skin is warm.             Comments: Blood filled blister left elbow  Bruised healing bullous area right knee   Neurological:      General: No focal deficit present.      Mental Status: She is alert.   Psychiatric:         Mood and Affect: Mood normal.         Thought Content: Thought content normal.                       Assessment and Plan   Diagnoses and all orders for this visit:    1. Medicare annual wellness visit, subsequent (Primary)  Comments:  discussed exercise weight loss    2. Bullous lesion  Comments:  has dermatology appointment    3. Ischemia of heart, chronic  Comments:  asprin 81 mg daily    4. Mixed hyperlipidemia  Comments:  stable    5. Primary hypertension  Comments:  stable    6. Chronic obstructive pulmonary disease, unspecified COPD type  Comments:  referred to pulmonology  Orders:  -     Cancel: Ambulatory Referral to Pulmonology  -     Ambulatory Referral to Pulmonology    7. Severe obesity with body mass index (BMI) of 36.0 to 36.9 with serious comorbidity  Comments:  join [facility] portion control  Assessment & Plan:  Patient's (Body mass index is 36.49 kg/m².) indicates that they are obese (BMI >30) with " health conditions that include hypertension, coronary heart disease, and dyslipidemias . Weight is improving with lifestyle modifications. BMI  is above average; BMI management plan is completed. We discussed portion control, increasing exercise, and joining a fitness center or start home based exercise program.                Follow Up   Return in about 6 months (around 3/13/2024).  Patient was given instructions and counseling regarding her condition or for health maintenance advice. Please see specific information pulled into the AVS if appropriate.

## 2023-10-26 RX ORDER — IPRATROPIUM BROMIDE AND ALBUTEROL SULFATE 2.5; .5 MG/3ML; MG/3ML
3 SOLUTION RESPIRATORY (INHALATION) EVERY 4 HOURS PRN
Qty: 360 ML | Refills: 0 | Status: SHIPPED | OUTPATIENT
Start: 2023-10-26

## 2023-10-26 RX ORDER — ALBUTEROL SULFATE 90 UG/1
2 AEROSOL, METERED RESPIRATORY (INHALATION) EVERY 4 HOURS PRN
Qty: 18 G | Refills: 3 | Status: SHIPPED | OUTPATIENT
Start: 2023-10-26

## 2023-10-26 RX ORDER — LISINOPRIL 10 MG/1
10 TABLET ORAL DAILY
Qty: 90 TABLET | Refills: 1 | Status: SHIPPED | OUTPATIENT
Start: 2023-10-26

## 2023-10-31 RX ORDER — IPRATROPIUM BROMIDE AND ALBUTEROL SULFATE 2.5; .5 MG/3ML; MG/3ML
3 SOLUTION RESPIRATORY (INHALATION) EVERY 4 HOURS PRN
Qty: 360 ML | Refills: 0 | Status: SHIPPED | OUTPATIENT
Start: 2023-10-31

## 2023-11-27 RX ORDER — ASPIRIN 81 MG/1
81 TABLET ORAL DAILY
Qty: 90 TABLET | Refills: 1 | Status: SHIPPED | OUTPATIENT
Start: 2023-11-27

## 2023-12-08 PROCEDURE — 87070 CULTURE OTHR SPECIMN AEROBIC: CPT | Performed by: FAMILY MEDICINE

## 2023-12-08 PROCEDURE — 87205 SMEAR GRAM STAIN: CPT | Performed by: FAMILY MEDICINE

## 2023-12-08 PROCEDURE — 89050 BODY FLUID CELL COUNT: CPT | Performed by: FAMILY MEDICINE

## 2023-12-15 ENCOUNTER — TELEMEDICINE (OUTPATIENT)
Dept: FAMILY MEDICINE CLINIC | Facility: CLINIC | Age: 68
End: 2023-12-15
Payer: MEDICARE

## 2023-12-15 DIAGNOSIS — I10 PRIMARY HYPERTENSION: Chronic | ICD-10-CM

## 2023-12-15 DIAGNOSIS — R23.8 BULLOUS LESION: Primary | ICD-10-CM

## 2023-12-15 PROCEDURE — 99214 OFFICE O/P EST MOD 30 MIN: CPT | Performed by: NURSE PRACTITIONER

## 2023-12-15 RX ORDER — CLOBETASOL PROPIONATE 0.5 MG/G
1 CREAM TOPICAL 2 TIMES DAILY
Qty: 60 G | Refills: 0 | Status: SHIPPED | OUTPATIENT
Start: 2023-12-15

## 2023-12-15 RX ORDER — LOSARTAN POTASSIUM 25 MG/1
25 TABLET ORAL DAILY
Qty: 30 TABLET | Refills: 0 | Status: SHIPPED | OUTPATIENT
Start: 2023-12-15

## 2023-12-15 NOTE — PROGRESS NOTES
The use of a video visit has been reviewed with the patient and verbal informed consent has been obtained.Subjective        Quynh Du is a 67 y.o. female.     No chief complaint on file.  CC blister on top of foot    History of Present Illness  Patient is home today and I am in office for visit.  She was seen in urgent care for this and had I & D .  She is having difficulty putting on shoes. She has several areas she rubbed the skin open from the blister that was there.   No fever no swelling she reports just tight skin.   One area under great toe that has scabbed over lesion.   Reviewed notes : 12/8/2023   Patient reports recurrence of bulla on skin right foot.  Has had this in past other areas. She is not doing anything for this.  Flaccid bulla, cleansed and then aspirated, then ruptured at the lateral aspect.clear slighlty yellow tinged fluid. Additional punctures at medial aspect of great toe.   Itches as reported.  Wound cultures were neg for bacterial infection.     Patient reports she has been in contact with a RN that told her this is related to blood pressure medication   Bullous phleboid was her concern.    No fevers no red streaks from wound.       The following portions of the patient's history were reviewed and updated as appropriate: allergies, current medications, past family history, past medical history, past social history, past surgical history and problem list.      Current Outpatient Medications:     albuterol sulfate  (90 Base) MCG/ACT inhaler, Inhale 2 puffs Every 4 (Four) Hours As Needed for Wheezing., Disp: 18 g, Rfl: 3    aspirin 81 MG EC tablet, Take 1 tablet by mouth Daily., Disp: 90 tablet, Rfl: 1    atorvastatin (LIPITOR) 20 MG tablet, Take 1 tablet by mouth Daily., Disp: 90 tablet, Rfl: 0    clobetasol (TEMOVATE) 0.05 % cream, Apply 1 application  topically to the appropriate area as directed 2 (Two) Times a Day., Disp: 60 g, Rfl: 0    ipratropium-albuterol (DUO-NEB)  0.5-2.5 mg/3 ml nebulizer, Take 3 mL by nebulization Every 4 (Four) Hours As Needed for Wheezing. J44.9, Disp: 360 mL, Rfl: 0    losartan (Cozaar) 25 MG tablet, Take 1 tablet by mouth Daily., Disp: 30 tablet, Rfl: 0    vitamin B-12 (CYANOCOBALAMIN) 100 MCG tablet, Take 0.5 tablets by mouth Daily., Disp: , Rfl:     Recent Results (from the past 4032 hour(s))   Body fluid cell count - Drainage, Foot, Right    Collection Time: 12/08/23  1:27 PM    Specimen: Foot, Right; Drainage   Result Value Ref Range    Color, Fluid Yellow     Appearance, Fluid Clear Clear    Nucleated Cells, Fluid 3 /mm3    Method: Hemacytometer Method    Body Fluid Culture - Body Fluid, Knee, Right    Collection Time: 12/08/23  1:28 PM    Specimen: Knee, Right; Body Fluid   Result Value Ref Range    Body Fluid Culture No growth at 5 days     Gram Stain Rare (1+) WBCs per low power field     Gram Stain No organisms seen          Review of Systems    Objective     There were no vitals taken for this visit.    Physical Exam  Constitutional:       Appearance: Normal appearance.   HENT:      Head: Normocephalic.   Pulmonary:      Effort: No respiratory distress.   Musculoskeletal:        Feet:    Feet:      Comments: Per video visit she is showing me dorsum right foot   Distal great toe open area from blister that is now flat from opening the blistered area.  Right great toe another are opened with skin more scabbed.   She is able wiggle toes but reports skin is tight.    Skin:     General: Skin is warm.   Neurological:      General: No focal deficit present.      Mental Status: She is alert.   Psychiatric:         Mood and Affect: Mood normal.         Thought Content: Thought content normal.     /80    Result Review :                Assessment & Plan    Diagnoses and all orders for this visit:    1. Bullous lesion (Primary)  Comments:  bullous pemphigoid  possibly related to lisinopril. start steroid cream changed blood pressure medicaiton    2.  Primary hypertension  Comments:  monitor blood pressure start steroid cream. stop lisinopril start losartan    Other orders  -     losartan (Cozaar) 25 MG tablet; Take 1 tablet by mouth Daily.  Dispense: 30 tablet; Refill: 0  -     clobetasol (TEMOVATE) 0.05 % cream; Apply 1 application  topically to the appropriate area as directed 2 (Two) Times a Day.  Dispense: 60 g; Refill: 0      Patient Instructions   Start steroid cream  Monitor for infection red warm hot increased pain red streakes.  Start the losartan stop lisinopril  Send pic thru my chart.   Get seen if any concerns.     Follow Up   Return in about 2 weeks (around 12/29/2023).    Patient was given instructions and counseling regarding her condition or for health maintenance advice. Please see specific information pulled into the AVS if appropriate.     Cheryl Perez, APRN    12/15/23

## 2023-12-15 NOTE — PATIENT INSTRUCTIONS
Start steroid cream  Monitor for infection red warm hot increased pain red streakes.  Start the losartan stop lisinopril  Send pic thru my chart.   Get seen if any concerns.

## 2024-01-04 ENCOUNTER — OFFICE VISIT (OUTPATIENT)
Dept: FAMILY MEDICINE CLINIC | Facility: CLINIC | Age: 69
End: 2024-01-04
Payer: MEDICARE

## 2024-01-04 VITALS
HEIGHT: 63 IN | OXYGEN SATURATION: 91 % | TEMPERATURE: 99.1 F | RESPIRATION RATE: 24 BRPM | BODY MASS INDEX: 36.14 KG/M2 | HEART RATE: 103 BPM | WEIGHT: 204 LBS

## 2024-01-04 DIAGNOSIS — B37.0 ORAL THRUSH: ICD-10-CM

## 2024-01-04 DIAGNOSIS — R23.8 BULLOUS LESION: Chronic | ICD-10-CM

## 2024-01-04 DIAGNOSIS — R05.1 ACUTE COUGH: ICD-10-CM

## 2024-01-04 DIAGNOSIS — J44.0 CHRONIC OBSTRUCTIVE PULMONARY DISEASE WITH ACUTE LOWER RESPIRATORY INFECTION: Primary | ICD-10-CM

## 2024-01-04 LAB
EXPIRATION DATE: NORMAL
FLUAV AG UPPER RESP QL IA.RAPID: NOT DETECTED
FLUBV AG UPPER RESP QL IA.RAPID: NOT DETECTED
INTERNAL CONTROL: NORMAL
Lab: NORMAL
SARS-COV-2 AG UPPER RESP QL IA.RAPID: NOT DETECTED

## 2024-01-04 PROCEDURE — 1159F MED LIST DOCD IN RCRD: CPT | Performed by: NURSE PRACTITIONER

## 2024-01-04 PROCEDURE — 87428 SARSCOV & INF VIR A&B AG IA: CPT | Performed by: NURSE PRACTITIONER

## 2024-01-04 PROCEDURE — 1160F RVW MEDS BY RX/DR IN RCRD: CPT | Performed by: NURSE PRACTITIONER

## 2024-01-04 PROCEDURE — 99214 OFFICE O/P EST MOD 30 MIN: CPT | Performed by: NURSE PRACTITIONER

## 2024-01-04 RX ORDER — IPRATROPIUM BROMIDE AND ALBUTEROL SULFATE 2.5; .5 MG/3ML; MG/3ML
3 SOLUTION RESPIRATORY (INHALATION) EVERY 4 HOURS PRN
Qty: 360 ML | Refills: 0 | Status: SHIPPED | OUTPATIENT
Start: 2024-01-04

## 2024-01-04 RX ORDER — CLOBETASOL PROPIONATE 0.5 MG/G
1 CREAM TOPICAL 2 TIMES DAILY
Qty: 60 G | Refills: 0 | Status: SHIPPED | OUTPATIENT
Start: 2024-01-04

## 2024-01-04 RX ORDER — CEPHALEXIN 750 MG/1
750 CAPSULE ORAL 4 TIMES DAILY
Qty: 28 CAPSULE | Refills: 0 | Status: SHIPPED | OUTPATIENT
Start: 2024-01-04

## 2024-01-04 RX ORDER — PREDNISONE 10 MG/1
TABLET ORAL
Qty: 20 TABLET | Refills: 0 | Status: SHIPPED | OUTPATIENT
Start: 2024-01-04 | End: 2024-01-12

## 2024-01-04 RX ORDER — CLOTRIMAZOLE 10 MG/1
10 LOZENGE ORAL; TOPICAL
Qty: 35 TABLET | Refills: 0 | Status: SHIPPED | OUTPATIENT
Start: 2024-01-04

## 2024-01-04 NOTE — PROGRESS NOTES
Subjective        Quynh Du is a 68 y.o. female.     Chief Complaint   Patient presents with    Foot lesions     Follow up     Cough     Fever, SOA, Sore Throat x 11 days. COVID home test negative x 2.       Cough      Patient is here for cough and fever congestion she also has new bullous lesion on other foot.   12/26 started sore throat then went into chest and head. Last fever 100 max. She has phlegm. Tested at home for covid was neg.   Using nebulizer every 4 hrs. Stopped zycam, tylenol off and muccinex DM. She has copd. Covid or flu negative.    Bullous lesion now on left foot she had video visit regarding right lesion of recent . Using clobetasol no pain but skin is very red after the blister.  She said it is causing her to have foot pain. Attempted to get her seen sooner with derm but they would not give sooner appointment.     Oral thrush found on exam. Instructed rinse with listerine after she uses her inhaler.   Prescribed mycelex marcella.    Short of air I recommend testing or ED she refused both. Told her symptoms can worsen would need further testing.           The following portions of the patient's history were reviewed and updated as appropriate: allergies, current medications, past family history, past medical history, past social history, past surgical history and problem list.      Current Outpatient Medications:     albuterol sulfate  (90 Base) MCG/ACT inhaler, Inhale 2 puffs Every 4 (Four) Hours As Needed for Wheezing., Disp: 18 g, Rfl: 3    aspirin 81 MG EC tablet, Take 1 tablet by mouth Daily., Disp: 90 tablet, Rfl: 1    atorvastatin (LIPITOR) 20 MG tablet, Take 1 tablet by mouth Daily., Disp: 90 tablet, Rfl: 0    clobetasol (TEMOVATE) 0.05 % cream, Apply 1 application  topically to the appropriate area as directed 2 (Two) Times a Day., Disp: 60 g, Rfl: 0    ipratropium-albuterol (DUO-NEB) 0.5-2.5 mg/3 ml nebulizer, Take 3 mL by nebulization Every 4 (Four) Hours As Needed for  "Wheezing. J44.9, Disp: 360 mL, Rfl: 0    losartan (Cozaar) 25 MG tablet, Take 1 tablet by mouth Daily., Disp: 30 tablet, Rfl: 0    vitamin B-12 (CYANOCOBALAMIN) 100 MCG tablet, Take 0.5 tablets by mouth Daily., Disp: , Rfl:     cephalexin (Keflex) 750 MG capsule, Take 1 capsule by mouth 4 (Four) Times a Day., Disp: 28 capsule, Rfl: 0    clotrimazole (MYCELEX) 10 MG marcella, Take 1 tablet by mouth 5 (Five) Times a Day., Disp: 35 tablet, Rfl: 0    predniSONE (DELTASONE) 10 MG tablet, Take 4 tablets by mouth Daily for 2 days, THEN 3 tablets Daily for 2 days, THEN 2 tablets Daily for 2 days, THEN 1 tablet Daily for 2 days., Disp: 20 tablet, Rfl: 0    Recent Results (from the past 4032 hour(s))   Body fluid cell count - Drainage, Foot, Right    Collection Time: 12/08/23  1:27 PM    Specimen: Foot, Right; Drainage   Result Value Ref Range    Color, Fluid Yellow     Appearance, Fluid Clear Clear    Nucleated Cells, Fluid 3 /mm3    Method: Hemacytometer Method    Body Fluid Culture - Body Fluid, Knee, Right    Collection Time: 12/08/23  1:28 PM    Specimen: Knee, Right; Body Fluid   Result Value Ref Range    Body Fluid Culture No growth at 5 days     Gram Stain Rare (1+) WBCs per low power field     Gram Stain No organisms seen    POCT SARS-CoV-2 Antigen ROXY + Flu    Collection Time: 01/04/24 11:50 AM    Specimen: Swab   Result Value Ref Range    SARS Antigen Not Detected Not Detected, Presumptive Negative    Influenza A Antigen ROXY Not Detected Not Detected    Influenza B Antigen ROXY Not Detected Not Detected    Internal Control Passed Passed    Lot Number [id number]     Expiration Date 10,178,784          Review of Systems   Respiratory:  Positive for cough.        Objective     Pulse 103   Temp 99.1 °F (37.3 °C) (Infrared)   Resp 24   Ht 160 cm (63\")   Wt 92.5 kg (204 lb)   SpO2 91%   BMI 36.14 kg/m²     Physical Exam  Vitals and nursing note reviewed.   Constitutional:       Appearance: She is obese.   HENT:      " Head: Normocephalic.      Comments: Thick white grey coating tongue     Right Ear: Tympanic membrane normal.      Left Ear: Tympanic membrane normal.      Mouth/Throat:      Mouth: Mucous membranes are moist.   Eyes:      Pupils: Pupils are equal, round, and reactive to light.   Cardiovascular:      Pulses: Normal pulses.      Heart sounds: Normal heart sounds.   Pulmonary:      Breath sounds: Normal breath sounds.      Comments: Loose productive cough  Abdominal:      General: Bowel sounds are normal.      Palpations: Abdomen is soft.   Musculoskeletal:        Feet:    Feet:      Right foot:      Skin integrity: Blister present.      Left foot:      Skin integrity: Blister present.      Comments: Bullous lesion left foot   Right foot skin red but nonpainful to touch.   Neurological:      General: No focal deficit present.      Mental Status: She is alert and oriented to person, place, and time.   Psychiatric:         Mood and Affect: Mood normal.         Behavior: Behavior normal.         Thought Content: Thought content normal.         Result Review :                Assessment & Plan    Diagnoses and all orders for this visit:    1. Chronic obstructive pulmonary disease with acute lower respiratory infection (Primary)  Comments:  steroids and keflex    2. Acute cough  Comments:  steroids recommend ED she declines  Orders:  -     POCT SARS-CoV-2 Antigen ROXY + Flu    3. Oral thrush  Comments:  myclex prescribed    4. Bullous lesion  Comments:  referral derm continue clobetasol    Other orders  -     predniSONE (DELTASONE) 10 MG tablet; Take 4 tablets by mouth Daily for 2 days, THEN 3 tablets Daily for 2 days, THEN 2 tablets Daily for 2 days, THEN 1 tablet Daily for 2 days.  Dispense: 20 tablet; Refill: 0  -     clotrimazole (MYCELEX) 10 MG marcella; Take 1 tablet by mouth 5 (Five) Times a Day.  Dispense: 35 tablet; Refill: 0  -     cephalexin (Keflex) 750 MG capsule; Take 1 capsule by mouth 4 (Four) Times a Day.   Dispense: 28 capsule; Refill: 0  -     clobetasol (TEMOVATE) 0.05 % cream; Apply 1 application  topically to the appropriate area as directed 2 (Two) Times a Day.  Dispense: 60 g; Refill: 0  -     ipratropium-albuterol (DUO-NEB) 0.5-2.5 mg/3 ml nebulizer; Take 3 mL by nebulization Every 4 (Four) Hours As Needed for Wheezing. J44.9  Dispense: 360 mL; Refill: 0      Patient Instructions   If you worsen go to the ED.  Use the clobetasol  Follow up with dermatology  Take steroids in am with food.  If fever or more short of air have be seen. Go to the ED.      Follow Up   Return in about 1 week (around 1/11/2024).    Patient was given instructions and counseling regarding her condition or for health maintenance advice. Please see specific information pulled into the AVS if appropriate.     ALYSON Wray    01/04/24      Answers submitted by the patient for this visit:  Other (Submitted on 12/28/2023)  Please describe your symptoms.: Bullous pemphagoid treatment and discussion for future treatment  Have you had these symptoms before?: Yes  How long have you been having these symptoms?: Greater than 2 weeks  Please list any medications you are currently taking for this condition.: Cream prescribed by Cheryl  Please describe any probable cause for these symptoms. : Unknown  Primary Reason for Visit (Submitted on 12/28/2023)  What is the primary reason for your visit?: Other

## 2024-01-04 NOTE — PATIENT INSTRUCTIONS
If you worsen go to the ED.  Use the clobetasol  Follow up with dermatology  Take steroids in am with food.  If fever or more short of air have be seen. Go to the ED.

## 2024-01-06 RX ORDER — FLUCONAZOLE 150 MG/1
TABLET ORAL
Qty: 3 TABLET | Refills: 0 | Status: SHIPPED | OUTPATIENT
Start: 2024-01-06

## 2024-01-11 ENCOUNTER — OFFICE VISIT (OUTPATIENT)
Dept: FAMILY MEDICINE CLINIC | Facility: CLINIC | Age: 69
End: 2024-01-11
Payer: MEDICARE

## 2024-01-11 VITALS
BODY MASS INDEX: 35.43 KG/M2 | OXYGEN SATURATION: 90 % | TEMPERATURE: 97.7 F | DIASTOLIC BLOOD PRESSURE: 88 MMHG | WEIGHT: 200 LBS | HEART RATE: 99 BPM | SYSTOLIC BLOOD PRESSURE: 150 MMHG

## 2024-01-11 DIAGNOSIS — R23.8 BULLOUS LESION: ICD-10-CM

## 2024-01-11 DIAGNOSIS — I10 PRIMARY HYPERTENSION: Primary | ICD-10-CM

## 2024-01-11 DIAGNOSIS — J44.0 CHRONIC OBSTRUCTIVE PULMONARY DISEASE WITH ACUTE LOWER RESPIRATORY INFECTION: Chronic | ICD-10-CM

## 2024-01-11 DIAGNOSIS — B37.0 ORAL THRUSH: Chronic | ICD-10-CM

## 2024-01-11 PROCEDURE — 1159F MED LIST DOCD IN RCRD: CPT | Performed by: NURSE PRACTITIONER

## 2024-01-11 PROCEDURE — 99214 OFFICE O/P EST MOD 30 MIN: CPT | Performed by: NURSE PRACTITIONER

## 2024-01-11 PROCEDURE — 3077F SYST BP >= 140 MM HG: CPT | Performed by: NURSE PRACTITIONER

## 2024-01-11 PROCEDURE — 3079F DIAST BP 80-89 MM HG: CPT | Performed by: NURSE PRACTITIONER

## 2024-01-11 PROCEDURE — 1160F RVW MEDS BY RX/DR IN RCRD: CPT | Performed by: NURSE PRACTITIONER

## 2024-01-11 RX ORDER — LOSARTAN POTASSIUM 50 MG/1
50 TABLET ORAL DAILY
Qty: 90 TABLET | Refills: 0 | Status: SHIPPED | OUTPATIENT
Start: 2024-01-11

## 2024-01-11 RX ORDER — ALBUTEROL SULFATE 90 UG/1
2 AEROSOL, METERED RESPIRATORY (INHALATION) EVERY 4 HOURS PRN
Qty: 18 G | Refills: 3 | Status: SHIPPED | OUTPATIENT
Start: 2024-01-11

## 2024-01-11 NOTE — PROGRESS NOTES
Subjective        Quynh Du is a 68 y.o. female.     Chief Complaint   Patient presents with    COPD     1 week follow up       COPD      Patient is here for management of her copd exacerbation. She says she still feeling much better, swelling went down from feet since stopping lisinopril and the blisters are healing well on feet.   She is noticing with new blood pressure medication blood pressure is high   She took last dose blood prednisone today.       The following portions of the patient's history were reviewed and updated as appropriate: allergies, current medications, past family history, past medical history, past social history, past surgical history and problem list.      Current Outpatient Medications:     albuterol sulfate  (90 Base) MCG/ACT inhaler, Inhale 2 puffs Every 4 (Four) Hours As Needed for Wheezing., Disp: 18 g, Rfl: 3    aspirin 81 MG EC tablet, Take 1 tablet by mouth Daily., Disp: 90 tablet, Rfl: 1    atorvastatin (LIPITOR) 20 MG tablet, Take 1 tablet by mouth Daily., Disp: 90 tablet, Rfl: 0    cephalexin (Keflex) 750 MG capsule, Take 1 capsule by mouth 4 (Four) Times a Day., Disp: 28 capsule, Rfl: 0    clobetasol (TEMOVATE) 0.05 % cream, Apply 1 application  topically to the appropriate area as directed 2 (Two) Times a Day., Disp: 60 g, Rfl: 0    ipratropium-albuterol (DUO-NEB) 0.5-2.5 mg/3 ml nebulizer, Take 3 mL by nebulization Every 4 (Four) Hours As Needed for Wheezing. J44.9, Disp: 360 mL, Rfl: 0    losartan (Cozaar) 50 MG tablet, Take 1 tablet by mouth Daily., Disp: 90 tablet, Rfl: 0    predniSONE (DELTASONE) 10 MG tablet, Take 4 tablets by mouth Daily for 2 days, THEN 3 tablets Daily for 2 days, THEN 2 tablets Daily for 2 days, THEN 1 tablet Daily for 2 days., Disp: 20 tablet, Rfl: 0    vitamin B-12 (CYANOCOBALAMIN) 100 MCG tablet, Take 0.5 tablets by mouth Daily., Disp: , Rfl:     Recent Results (from the past 4032 hour(s))   Body fluid cell count - Drainage, Foot,  Right    Collection Time: 12/08/23  1:27 PM    Specimen: Foot, Right; Drainage   Result Value Ref Range    Color, Fluid Yellow     Appearance, Fluid Clear Clear    Nucleated Cells, Fluid 3 /mm3    Method: Hemacytometer Method    Body Fluid Culture - Body Fluid, Knee, Right    Collection Time: 12/08/23  1:28 PM    Specimen: Knee, Right; Body Fluid   Result Value Ref Range    Body Fluid Culture No growth at 5 days     Gram Stain Rare (1+) WBCs per low power field     Gram Stain No organisms seen    POCT SARS-CoV-2 Antigen ROXY + Flu    Collection Time: 01/04/24 11:50 AM    Specimen: Swab   Result Value Ref Range    SARS Antigen Not Detected Not Detected, Presumptive Negative    Influenza A Antigen ROXY Not Detected Not Detected    Influenza B Antigen ROXY Not Detected Not Detected    Internal Control Passed Passed    Lot Number [id number]     Expiration Date 10,272,024          Review of Systems    Objective     /88   Pulse 99   Temp 97.7 °F (36.5 °C)   Wt 90.7 kg (200 lb)   SpO2 90%   BMI 35.43 kg/m²     Physical Exam  Constitutional:       Appearance: She is obese.   HENT:      Head: Normocephalic.      Right Ear: Tympanic membrane normal.      Left Ear: Tympanic membrane normal.      Nose: Nose normal.      Mouth/Throat:      Mouth: Mucous membranes are moist.   Cardiovascular:      Rate and Rhythm: Normal rate and regular rhythm.      Pulses: Normal pulses.      Heart sounds: Normal heart sounds.   Abdominal:      Palpations: Abdomen is soft.   Musculoskeletal:        Feet:    Feet:      Comments: Healing well possibly related to lisinopril :  Skin:     General: Skin is warm.   Neurological:      General: No focal deficit present.      Mental Status: She is alert and oriented to person, place, and time.   Psychiatric:         Mood and Affect: Mood normal.         Thought Content: Thought content normal.         Result Review :                Assessment & Plan    Diagnoses and all orders for this  visit:    1. Primary hypertension (Primary)  Comments:  increased losartan to 50 mg daily.    2. Oral thrush  Comments:  improving.    3. Chronic obstructive pulmonary disease with acute lower respiratory infection  Comments:  stable one more steroid continues inhalers and breathing treatment will see pulmonology upcoming    4. Bullous lesion  Comments:  has appointment with dermatology otherwise healing well.    Other orders  -     albuterol sulfate  (90 Base) MCG/ACT inhaler; Inhale 2 puffs Every 4 (Four) Hours As Needed for Wheezing.  Dispense: 18 g; Refill: 3  -     losartan (Cozaar) 50 MG tablet; Take 1 tablet by mouth Daily.  Dispense: 90 tablet; Refill: 0      There are no Patient Instructions on file for this visit.    Follow Up   No follow-ups on file.    Patient was given instructions and counseling regarding her condition or for health maintenance advice. Please see specific information pulled into the AVS if appropriate.     Cheryl Perez, APRN    01/11/24      Answers submitted by the patient for this visit:  Other (Submitted on 1/10/2024)  Please describe your symptoms.: Follow up visit  Have you had these symptoms before?: Yes  How long have you been having these symptoms?: 1-2 weeks  Primary Reason for Visit (Submitted on 1/10/2024)  What is the primary reason for your visit?: Other

## 2024-01-21 RX ORDER — ATORVASTATIN CALCIUM 20 MG/1
20 TABLET, FILM COATED ORAL DAILY
Qty: 90 TABLET | Refills: 0 | Status: SHIPPED | OUTPATIENT
Start: 2024-01-21

## 2024-04-04 ENCOUNTER — OFFICE VISIT (OUTPATIENT)
Dept: FAMILY MEDICINE CLINIC | Facility: CLINIC | Age: 69
End: 2024-04-04
Payer: MEDICARE

## 2024-04-04 VITALS
DIASTOLIC BLOOD PRESSURE: 90 MMHG | OXYGEN SATURATION: 92 % | HEART RATE: 92 BPM | TEMPERATURE: 96.6 F | HEIGHT: 63 IN | SYSTOLIC BLOOD PRESSURE: 160 MMHG | RESPIRATION RATE: 18 BRPM | BODY MASS INDEX: 36.21 KG/M2 | WEIGHT: 204.4 LBS

## 2024-04-04 DIAGNOSIS — R35.0 FREQUENCY OF URINATION: ICD-10-CM

## 2024-04-04 DIAGNOSIS — R30.0 BURNING WITH URINATION: Primary | ICD-10-CM

## 2024-04-04 DIAGNOSIS — I10 PRIMARY HYPERTENSION: Chronic | ICD-10-CM

## 2024-04-04 PROBLEM — R31.9 HEMATURIA: Status: RESOLVED | Noted: 2019-05-16 | Resolved: 2024-04-04

## 2024-04-04 LAB
BILIRUB BLD-MCNC: NEGATIVE MG/DL
CLARITY, POC: CLEAR
COLOR UR: YELLOW
EXPIRATION DATE: NORMAL
GLUCOSE PRE LAC PO SERPL-MCNC: NEGATIVE MG/DL
KETONES UR QL: NEGATIVE
LEUKOCYTE EST, POC: NEGATIVE
Lab: NORMAL
NITRITE UR-MCNC: NEGATIVE MG/ML
PH UR: 6 [PH] (ref 5–8)
PROT UR STRIP-MCNC: NEGATIVE MG/DL
RBC # UR STRIP: NEGATIVE /UL
SP GR UR: 1.01 (ref 1–1.03)
UROBILINOGEN UR QL: NORMAL

## 2024-04-04 PROCEDURE — 99213 OFFICE O/P EST LOW 20 MIN: CPT | Performed by: NURSE PRACTITIONER

## 2024-04-04 PROCEDURE — 3080F DIAST BP >= 90 MM HG: CPT | Performed by: NURSE PRACTITIONER

## 2024-04-04 PROCEDURE — 81003 URINALYSIS AUTO W/O SCOPE: CPT | Performed by: NURSE PRACTITIONER

## 2024-04-04 PROCEDURE — 1160F RVW MEDS BY RX/DR IN RCRD: CPT | Performed by: NURSE PRACTITIONER

## 2024-04-04 PROCEDURE — 1159F MED LIST DOCD IN RCRD: CPT | Performed by: NURSE PRACTITIONER

## 2024-04-04 PROCEDURE — 3077F SYST BP >= 140 MM HG: CPT | Performed by: NURSE PRACTITIONER

## 2024-04-04 RX ORDER — MULTIPLE VITAMINS W/ MINERALS TAB 9MG-400MCG
1 TAB ORAL DAILY
COMMUNITY

## 2024-04-04 NOTE — PATIENT INSTRUCTIONS
Monitor blood pressure once day for one week send thru my chart follow up on urine culture.   Follow up[ on lab results.

## 2024-04-04 NOTE — PROGRESS NOTES
Subjective        Quynh Du is a 68 y.o. female.     Chief Complaint   Patient presents with    Urinary Frequency     2-3 days, pt taking Cystex, no progression    Difficulty Urinating     Burning, 2-3 days    Med Refill     HTN medicine needs refill       Urinary Frequency   Associated symptoms include frequency.   Difficulty Urinating   Associated symptoms include frequency.     Patient is here for concerns she has UTI. She has had urinary frequency and burning with urination for 3 days.   She is drinking water and taking cystex for symptoms.   She is here for management of her hypertension: missed last appointment.    Dysuria and frequency: started 3 days ago. No blood in urine clear, taking cystex not helping . Burning is worse symptom. Urine dip negative.     Hypertension: taking losartan 50mg daily. She checks at home 135/81 never higher than that . She feels is white coat hypertension.         The following portions of the patient's history were reviewed and updated as appropriate: allergies, current medications, past family history, past medical history, past social history, past surgical history and problem list.      Current Outpatient Medications:     albuterol sulfate  (90 Base) MCG/ACT inhaler, Inhale 2 puffs Every 4 (Four) Hours As Needed for Wheezing., Disp: 18 g, Rfl: 3    aspirin 81 MG EC tablet, Take 1 tablet by mouth Daily., Disp: 90 tablet, Rfl: 1    atorvastatin (LIPITOR) 20 MG tablet, Take 1 tablet by mouth Daily., Disp: 90 tablet, Rfl: 0    ipratropium-albuterol (DUO-NEB) 0.5-2.5 mg/3 ml nebulizer, Take 3 mL by nebulization Every 4 (Four) Hours As Needed for Wheezing. J44.9, Disp: 360 mL, Rfl: 0    losartan (Cozaar) 50 MG tablet, Take 1 tablet by mouth Daily., Disp: 90 tablet, Rfl: 0    multivitamin with minerals tablet tablet, Take 1 tablet by mouth Daily., Disp: , Rfl:     Recent Results (from the past 4032 hour(s))   Body fluid cell count - Drainage, Foot, Right    Collection  "Time: 12/08/23  1:27 PM    Specimen: Foot, Right; Drainage   Result Value Ref Range    Color, Fluid Yellow     Appearance, Fluid Clear Clear    Nucleated Cells, Fluid 3 /mm3    Method: Hemacytometer Method    Body Fluid Culture - Body Fluid, Knee, Right    Collection Time: 12/08/23  1:28 PM    Specimen: Knee, Right; Body Fluid   Result Value Ref Range    Body Fluid Culture No growth at 5 days     Gram Stain Rare (1+) WBCs per low power field     Gram Stain No organisms seen    POCT SARS-CoV-2 Antigen ROXY + Flu    Collection Time: 01/04/24 11:50 AM    Specimen: Swab   Result Value Ref Range    SARS Antigen Not Detected Not Detected, Presumptive Negative    Influenza A Antigen ROXY Not Detected Not Detected    Influenza B Antigen ORXY Not Detected Not Detected    Internal Control Passed Passed    Lot Number [id number]     Expiration Date 10,272,024    POCT urinalysis dipstick, automated    Collection Time: 04/04/24  2:07 PM    Specimen: Urine   Result Value Ref Range    Color Yellow Yellow, Straw, Dark Yellow, Dang    Clarity, UA Clear Clear    Specific Gravity  1.010 1.005 - 1.030    pH, Urine 6.0 5.0 - 8.0    Leukocytes Negative Negative    Nitrite, UA Negative Negative    Protein, POC Negative Negative mg/dL    Glucose Negative     Ketones, UA Negative Negative    Urobilinogen, UA 0.2 E.U./dL Normal, 0.2 E.U./dL    Bilirubin Negative Negative    Blood, UA Negative Negative    Lot Number [id number]     Expiration Date 04/30/2024          Review of Systems   Genitourinary:  Positive for difficulty urinating and frequency.       Objective     /90   Pulse 92   Temp 96.6 °F (35.9 °C) (Temporal)   Resp 18   Ht 160 cm (62.99\")   Wt 92.7 kg (204 lb 6.4 oz)   SpO2 92%   BMI 36.22 kg/m²     Physical Exam  Vitals and nursing note reviewed.   Constitutional:       Appearance: She is obese.   HENT:      Head: Normocephalic.      Right Ear: External ear normal.      Left Ear: External ear normal.      Nose: Nose normal. "      Mouth/Throat:      Mouth: Mucous membranes are moist.   Eyes:      Pupils: Pupils are equal, round, and reactive to light.   Cardiovascular:      Rate and Rhythm: Normal rate and regular rhythm.      Pulses: Normal pulses.      Heart sounds: Normal heart sounds.   Pulmonary:      Effort: Pulmonary effort is normal.      Breath sounds: Normal breath sounds.   Abdominal:      General: Bowel sounds are normal.      Palpations: Abdomen is soft.   Skin:     General: Skin is warm.   Neurological:      General: No focal deficit present.      Mental Status: She is alert and oriented to person, place, and time.   Psychiatric:         Mood and Affect: Mood normal.         Behavior: Behavior normal.         Result Review :                Assessment & Plan    Diagnoses and all orders for this visit:    1. Burning with urination (Primary)  Comments:  sent for cultrue  Orders:  -     POCT urinalysis dipstick, automated  -     Urine Culture - Urine, Urine, Clean Catch; Future    2. Frequency of urination  Comments:  sent for urine culture  Orders:  -     POCT urinalysis dipstick, automated    3. Primary hypertension  Comments:  monitor blood pressure send me weeks readings to determine white coat.  Orders:  -     Lipid Panel; Future  -     Comprehensive Metabolic Panel; Future      Patient Instructions   Monitor blood pressure once day for one week send thru my chart follow up on urine culture.   Follow up[ on lab results.     Follow Up   Return in about 6 months (around 10/4/2024), or if symptoms worsen or fail to improve.    Patient was given instructions and counseling regarding her condition or for health maintenance advice. Please see specific information pulled into the AVS if appropriate.     Cheryl Perez, ALYSON    04/05/24      Answers submitted by the patient for this visit:  Primary Reason for Visit (Submitted on 4/4/2024)  What is the primary reason for your visit?: Painful Urination

## 2024-04-05 ENCOUNTER — LAB REQUISITION (OUTPATIENT)
Dept: LAB | Facility: HOSPITAL | Age: 69
End: 2024-04-05
Payer: MEDICARE

## 2024-04-05 DIAGNOSIS — R30.0 DYSURIA: ICD-10-CM

## 2024-04-05 LAB
BILIRUB UR QL STRIP: NEGATIVE
CLARITY UR: CLEAR
COLOR UR: YELLOW
GLUCOSE UR STRIP-MCNC: NEGATIVE MG/DL
HGB UR QL STRIP.AUTO: NEGATIVE
HOLD SPECIMEN: NORMAL
KETONES UR QL STRIP: NEGATIVE
LEUKOCYTE ESTERASE UR QL STRIP.AUTO: NEGATIVE
NITRITE UR QL STRIP: NEGATIVE
PH UR STRIP.AUTO: 6.5 [PH] (ref 5–8)
PROT UR QL STRIP: NEGATIVE
SP GR UR STRIP: 1.01 (ref 1–1.03)
UROBILINOGEN UR QL STRIP: NORMAL

## 2024-04-06 RX ORDER — LOSARTAN POTASSIUM 50 MG/1
50 TABLET ORAL DAILY
Qty: 90 TABLET | Refills: 0 | Status: SHIPPED | OUTPATIENT
Start: 2024-04-06

## 2024-05-20 RX ORDER — ATORVASTATIN CALCIUM 20 MG/1
20 TABLET, FILM COATED ORAL DAILY
Qty: 30 TABLET | Refills: 0 | Status: SHIPPED | OUTPATIENT
Start: 2024-05-20

## 2024-06-03 ENCOUNTER — LAB (OUTPATIENT)
Dept: LAB | Facility: HOSPITAL | Age: 69
End: 2024-06-03
Payer: MEDICARE

## 2024-06-03 DIAGNOSIS — R73.9 HIGH BLOOD SUGAR: Primary | ICD-10-CM

## 2024-06-03 DIAGNOSIS — I10 PRIMARY HYPERTENSION: Chronic | ICD-10-CM

## 2024-06-03 LAB
ALBUMIN SERPL-MCNC: 3.9 G/DL (ref 3.5–5.2)
ALBUMIN/GLOB SERPL: 1.2 G/DL
ALP SERPL-CCNC: 137 U/L (ref 39–117)
ALT SERPL W P-5'-P-CCNC: 25 U/L (ref 1–33)
ANION GAP SERPL CALCULATED.3IONS-SCNC: 9.8 MMOL/L (ref 5–15)
AST SERPL-CCNC: 25 U/L (ref 1–32)
BILIRUB SERPL-MCNC: 0.7 MG/DL (ref 0–1.2)
BUN SERPL-MCNC: 10 MG/DL (ref 8–23)
BUN/CREAT SERPL: 12.7 (ref 7–25)
CALCIUM SPEC-SCNC: 9.4 MG/DL (ref 8.6–10.5)
CHLORIDE SERPL-SCNC: 101 MMOL/L (ref 98–107)
CHOLEST SERPL-MCNC: 122 MG/DL (ref 0–200)
CO2 SERPL-SCNC: 29.2 MMOL/L (ref 22–29)
CREAT SERPL-MCNC: 0.79 MG/DL (ref 0.57–1)
EGFRCR SERPLBLD CKD-EPI 2021: 81.1 ML/MIN/1.73
GLOBULIN UR ELPH-MCNC: 3.2 GM/DL
GLUCOSE SERPL-MCNC: 198 MG/DL (ref 65–99)
HDLC SERPL-MCNC: 46 MG/DL (ref 40–60)
LDLC SERPL CALC-MCNC: 54 MG/DL (ref 0–100)
LDLC/HDLC SERPL: 1.1 {RATIO}
POTASSIUM SERPL-SCNC: 3.7 MMOL/L (ref 3.5–5.2)
PROT SERPL-MCNC: 7.1 G/DL (ref 6–8.5)
SODIUM SERPL-SCNC: 140 MMOL/L (ref 136–145)
TRIGL SERPL-MCNC: 127 MG/DL (ref 0–150)
VLDLC SERPL-MCNC: 22 MG/DL (ref 5–40)

## 2024-06-03 PROCEDURE — 36415 COLL VENOUS BLD VENIPUNCTURE: CPT

## 2024-06-03 PROCEDURE — 80053 COMPREHEN METABOLIC PANEL: CPT

## 2024-06-03 PROCEDURE — 80061 LIPID PANEL: CPT

## 2024-06-03 RX ORDER — ASPIRIN 81 MG/1
81 TABLET ORAL DAILY
Qty: 90 TABLET | Refills: 1 | Status: SHIPPED | OUTPATIENT
Start: 2024-06-03

## 2024-06-03 NOTE — TELEPHONE ENCOUNTER
With her recent lab results her blood sugar was 198 have concerns for diabetes.  I ordered an A1c she is to go get this done further treatment pending the results stressed at this point she could be a diabetic

## 2024-06-06 ENCOUNTER — LAB (OUTPATIENT)
Dept: LAB | Facility: HOSPITAL | Age: 69
End: 2024-06-06
Payer: MEDICARE

## 2024-06-06 DIAGNOSIS — R73.9 HIGH BLOOD SUGAR: ICD-10-CM

## 2024-06-06 LAB — HBA1C MFR BLD: 8.55 % (ref 4.8–5.6)

## 2024-06-06 PROCEDURE — 83036 HEMOGLOBIN GLYCOSYLATED A1C: CPT

## 2024-06-06 PROCEDURE — 36415 COLL VENOUS BLD VENIPUNCTURE: CPT

## 2024-06-13 RX ORDER — TRIAMCINOLONE ACETONIDE 1 MG/G
1 CREAM TOPICAL 3 TIMES DAILY
COMMUNITY
Start: 2024-06-05

## 2024-06-14 ENCOUNTER — OFFICE VISIT (OUTPATIENT)
Dept: FAMILY MEDICINE CLINIC | Facility: CLINIC | Age: 69
End: 2024-06-14
Payer: MEDICARE

## 2024-06-14 VITALS
BODY MASS INDEX: 35.33 KG/M2 | HEIGHT: 63 IN | TEMPERATURE: 99.2 F | HEART RATE: 102 BPM | RESPIRATION RATE: 15 BRPM | SYSTOLIC BLOOD PRESSURE: 164 MMHG | WEIGHT: 199.4 LBS | OXYGEN SATURATION: 99 % | DIASTOLIC BLOOD PRESSURE: 100 MMHG

## 2024-06-14 DIAGNOSIS — E11.9 NEW ONSET TYPE 2 DIABETES MELLITUS: Primary | ICD-10-CM

## 2024-06-14 DIAGNOSIS — I10 PRIMARY HYPERTENSION: Chronic | ICD-10-CM

## 2024-06-14 DIAGNOSIS — R42 LIGHTHEADEDNESS: ICD-10-CM

## 2024-06-14 DIAGNOSIS — R23.8 BULLOUS LESION: Chronic | ICD-10-CM

## 2024-06-14 PROBLEM — B37.0 ORAL THRUSH: Status: RESOLVED | Noted: 2024-01-04 | Resolved: 2024-06-14

## 2024-06-14 LAB — GLUCOSE BLDC GLUCOMTR-MCNC: 145 MG/DL (ref 70–130)

## 2024-06-14 PROCEDURE — 1126F AMNT PAIN NOTED NONE PRSNT: CPT | Performed by: NURSE PRACTITIONER

## 2024-06-14 PROCEDURE — 99214 OFFICE O/P EST MOD 30 MIN: CPT | Performed by: NURSE PRACTITIONER

## 2024-06-14 PROCEDURE — 3077F SYST BP >= 140 MM HG: CPT | Performed by: NURSE PRACTITIONER

## 2024-06-14 PROCEDURE — 3080F DIAST BP >= 90 MM HG: CPT | Performed by: NURSE PRACTITIONER

## 2024-06-14 PROCEDURE — G2211 COMPLEX E/M VISIT ADD ON: HCPCS | Performed by: NURSE PRACTITIONER

## 2024-06-14 PROCEDURE — 82948 REAGENT STRIP/BLOOD GLUCOSE: CPT | Performed by: NURSE PRACTITIONER

## 2024-06-14 RX ORDER — METFORMIN HYDROCHLORIDE 500 MG/1
500 TABLET, EXTENDED RELEASE ORAL
Qty: 30 TABLET | Refills: 1 | Status: SHIPPED | OUTPATIENT
Start: 2024-06-14

## 2024-06-14 NOTE — PATIENT INSTRUCTIONS
You need to monitor blood sugar daily  You will hear from endocrinology about education and management.   Start metfromin 500mg ER daily.   Watch skin for infection.

## 2024-06-14 NOTE — PROGRESS NOTES
Subjective        Quynh Du is a 68 y.o. female.     Chief Complaint   Patient presents with    Diabetes     On set       Diabetes      Patient is here for new onset diabetes she is trying to loose weight and is monitoring how she eats .   Recent A1C 8.55 high . She has hypertension.   BS in office 145 this post eating     New onset diabetes start metformin 500 mg ER once day.   Referral endocrinology for diabetes education and treatment   She said she will not stick herself to check blood sugar. Just says she will no go thru this every day.     Hypertension she has white coat hypertension she checks at [facility] 116/62      Bullous lesions she just had recurrence left elbow and right elbow.   Left foot . She had biopsy yesterday with dermatology.   She said she is having difficulty getting out of bed uses elbows to get out.   The lesion left anterior foot was drained and dressed by dermatology yesterday.             The following portions of the patient's history were reviewed and updated as appropriate: allergies, current medications, past family history, past medical history, past social history, past surgical history and problem list.      Current Outpatient Medications:     albuterol sulfate  (90 Base) MCG/ACT inhaler, Inhale 2 puffs Every 4 (Four) Hours As Needed for Wheezing., Disp: 18 g, Rfl: 3    aspirin 81 MG EC tablet, Take 1 tablet by mouth Daily., Disp: 90 tablet, Rfl: 1    atorvastatin (LIPITOR) 20 MG tablet, Take 1 tablet by mouth Daily., Disp: 30 tablet, Rfl: 0    BERBERINE CHLORIDE PO, Take 1,000 mg by mouth Daily., Disp: , Rfl:     ipratropium-albuterol (DUO-NEB) 0.5-2.5 mg/3 ml nebulizer, Take 3 mL by nebulization Every 4 (Four) Hours As Needed for Wheezing. J44.9, Disp: 360 mL, Rfl: 0    losartan (Cozaar) 50 MG tablet, Take 1 tablet by mouth Daily., Disp: 90 tablet, Rfl: 0    metFORMIN ER (GLUCOPHAGE-XR) 500 MG 24 hr tablet, Take 1 tablet by mouth Daily With Breakfast., Disp: 30 tablet,  Rfl: 1    multivitamin with minerals tablet tablet, Take 1 tablet by mouth Daily. (Patient not taking: Reported on 6/14/2024), Disp: , Rfl:     triamcinolone (KENALOG) 0.1 % cream, Apply 1 Application topically to the appropriate area as directed 3 (Three) Times a Day. (Patient not taking: Reported on 6/14/2024), Disp: , Rfl:     Recent Results (from the past 4032 hour(s))   POCT SARS-CoV-2 Antigen ROXY + Flu    Collection Time: 01/04/24 11:50 AM    Specimen: Swab   Result Value Ref Range    SARS Antigen Not Detected Not Detected, Presumptive Negative    Influenza A Antigen ROXY Not Detected Not Detected    Influenza B Antigen ROXY Not Detected Not Detected    Internal Control Passed Passed    Lot Number [id number]     Expiration Date 10,272,024    POCT urinalysis dipstick, automated    Collection Time: 04/04/24  2:07 PM    Specimen: Urine   Result Value Ref Range    Color Yellow Yellow, Straw, Dark Yellow, Dang    Clarity, UA Clear Clear    Specific Gravity  1.010 1.005 - 1.030    pH, Urine 6.0 5.0 - 8.0    Leukocytes Negative Negative    Nitrite, UA Negative Negative    Protein, POC Negative Negative mg/dL    Glucose Negative     Ketones, UA Negative Negative    Urobilinogen, UA 0.2 E.U./dL Normal, 0.2 E.U./dL    Bilirubin Negative Negative    Blood, UA Negative Negative    Lot Number [id number]     Expiration Date 04/30/2024    Urinalysis With Culture If Indicated - Urine, Clean Catch    Collection Time: 04/04/24  2:15 PM    Specimen: Urine, Clean Catch   Result Value Ref Range    Color, UA Yellow Yellow, Straw    Appearance, UA Clear Clear    pH, UA 6.5 5.0 - 8.0    Specific Gravity, UA 1.012 1.005 - 1.030    Glucose, UA Negative Negative    Ketones, UA Negative Negative    Bilirubin, UA Negative Negative    Blood, UA Negative Negative    Protein, UA Negative Negative    Leuk Esterase, UA Negative Negative    Nitrite, UA Negative Negative    Urobilinogen, UA 0.2 E.U./dL 0.2 - 1.0 E.U./dL   [facility] Urine Culture Tube -  "Urine, Clean Catch    Collection Time: 04/04/24  2:15 PM    Specimen: Urine, Clean Catch   Result Value Ref Range    Extra Tube Hold for add-ons.    Lipid Panel    Collection Time: 06/03/24  9:38 AM    Specimen: Blood   Result Value Ref Range    Total Cholesterol 122 0 - 200 mg/dL    Triglycerides 127 0 - 150 mg/dL    HDL Cholesterol 46 40 - 60 mg/dL    LDL Cholesterol  54 0 - 100 mg/dL    VLDL Cholesterol 22 5 - 40 mg/dL    LDL/HDL Ratio 1.10    Comprehensive Metabolic Panel    Collection Time: 06/03/24  9:38 AM    Specimen: Blood   Result Value Ref Range    Glucose 198 (H) 65 - 99 mg/dL    BUN 10 8 - 23 mg/dL    Creatinine 0.79 0.57 - 1.00 mg/dL    Sodium 140 136 - 145 mmol/L    Potassium 3.7 3.5 - 5.2 mmol/L    Chloride 101 98 - 107 mmol/L    CO2 29.2 (H) 22.0 - 29.0 mmol/L    Calcium 9.4 8.6 - 10.5 mg/dL    Total Protein 7.1 6.0 - 8.5 g/dL    Albumin 3.9 3.5 - 5.2 g/dL    ALT (SGPT) 25 1 - 33 U/L    AST (SGOT) 25 1 - 32 U/L    Alkaline Phosphatase 137 (H) 39 - 117 U/L    Total Bilirubin 0.7 0.0 - 1.2 mg/dL    Globulin 3.2 gm/dL    A/G Ratio 1.2 g/dL    BUN/Creatinine Ratio 12.7 7.0 - 25.0    Anion Gap 9.8 5.0 - 15.0 mmol/L    eGFR 81.1 >60.0 mL/min/1.73   Hemoglobin A1c    Collection Time: 06/06/24  8:56 AM    Specimen: Blood   Result Value Ref Range    Hemoglobin A1C 8.55 (H) 4.80 - 5.60 %   POCT Glucose    Collection Time: 06/14/24  1:00 PM    Specimen: Blood   Result Value Ref Range    Glucose 145 (A) 70 - 130 mg/dL         Review of Systems    Objective     /100 (BP Location: Left arm, Patient Position: Sitting, Cuff Size: Adult)   Pulse 102   Temp 99.2 °F (37.3 °C) (Oral)   Resp 15   Ht 160 cm (62.99\")   Wt 90.4 kg (199 lb 6.4 oz)   SpO2 99%   BMI 35.33 kg/m²     Physical Exam  Skin:            Comments: Erythema left elbow and right scab in center of left post I & D left foot anterior dressing intact.            Result Review :                Assessment & Plan    Diagnoses and all orders for " this visit:    1. New onset type 2 diabetes mellitus (Primary)  Comments:  referral to endocrinology for education and treatment I again recommend daily blood sugar checks and she refuses.  Orders:  -     Ambulatory Referral to Diabetic Education  -     Ambulatory Referral to Endocrinology    2. Lightheadedness  Comments:  stable recent diagnosis of new onset diabetes  Orders:  -     POCT Glucose    3. Primary hypertension  Comments:  white coat . high in office she is checkng at home    4. Bullous lesion  Comments:  followed by dermatolgoy dressings intact .    Other orders  -     Discontinue: metFORMIN (GLUCOPHAGE) 500 MG tablet; Take 1 tablet by mouth 2 (Two) Times a Day With Meals.  Dispense: 30 tablet; Refill: 1  -     metFORMIN ER (GLUCOPHAGE-XR) 500 MG 24 hr tablet; Take 1 tablet by mouth Daily With Breakfast.  Dispense: 30 tablet; Refill: 1      Patient Instructions   You need to monitor blood sugar daily  You will hear from endocrinology about education and management.   Start metfromin 500mg ER daily.   Watch skin for infection.       Follow Up   Return in about 3 months (around 9/14/2024).    Patient was given instructions and counseling regarding her condition or for health maintenance advice. Please see specific information pulled into the AVS if appropriate.     Cheryl Perez, APRN    06/14/24

## 2024-07-04 RX ORDER — ATORVASTATIN CALCIUM 20 MG/1
20 TABLET, FILM COATED ORAL DAILY
Qty: 30 TABLET | Refills: 0 | Status: SHIPPED | OUTPATIENT
Start: 2024-07-04

## 2024-07-04 RX ORDER — LOSARTAN POTASSIUM 50 MG/1
50 TABLET ORAL DAILY
Qty: 90 TABLET | Refills: 0 | Status: SHIPPED | OUTPATIENT
Start: 2024-07-04

## 2024-08-09 ENCOUNTER — OFFICE VISIT (OUTPATIENT)
Dept: ENDOCRINOLOGY | Facility: CLINIC | Age: 69
End: 2024-08-09
Payer: MEDICARE

## 2024-08-09 VITALS
SYSTOLIC BLOOD PRESSURE: 140 MMHG | WEIGHT: 183 LBS | OXYGEN SATURATION: 93 % | BODY MASS INDEX: 32.43 KG/M2 | DIASTOLIC BLOOD PRESSURE: 80 MMHG | HEIGHT: 63 IN | HEART RATE: 97 BPM

## 2024-08-09 DIAGNOSIS — E11.65 TYPE 2 DIABETES MELLITUS WITH HYPERGLYCEMIA, WITHOUT LONG-TERM CURRENT USE OF INSULIN: Primary | ICD-10-CM

## 2024-08-09 DIAGNOSIS — E66.9 OBESITY (BMI 30-39.9): ICD-10-CM

## 2024-08-09 LAB — GLUCOSE BLDC GLUCOMTR-MCNC: 117 MG/DL (ref 70–105)

## 2024-08-09 PROCEDURE — 82948 REAGENT STRIP/BLOOD GLUCOSE: CPT | Performed by: INTERNAL MEDICINE

## 2024-08-09 RX ORDER — KETOROLAC TROMETHAMINE 30 MG/ML
1 INJECTION, SOLUTION INTRAMUSCULAR; INTRAVENOUS
Qty: 1 EACH | Refills: 0 | Status: SHIPPED | OUTPATIENT
Start: 2024-08-09

## 2024-08-09 RX ORDER — METFORMIN HYDROCHLORIDE 500 MG/1
500 TABLET, EXTENDED RELEASE ORAL
Qty: 90 TABLET | Refills: 1 | Status: SHIPPED | OUTPATIENT
Start: 2024-08-09

## 2024-08-09 RX ORDER — BLOOD-GLUCOSE SENSOR
1 EACH MISCELLANEOUS
Qty: 2 EACH | Refills: 5 | Status: SHIPPED | OUTPATIENT
Start: 2024-08-09

## 2024-08-09 NOTE — PATIENT INSTRUCTIONS
Please,    - Continue to make lifestyle modifications.  - Please make appoint with diabetic educators.  - Continue metformin  mg 1 pill by mouth daily.  - Ideally need to check your blood sugar once a day in the morning, but as requested I have ordered continuous glucose monitoring system called freestyle daniel for you.  Please make sure you bring the reader with you during a follow-up visit so I can review the blood sugars and plan therapy accordingly.    Plan for repeat blood work nonfasting before next visit.    Thank you for your visit today.    If you have any questions or concerns please feel free to reach out of the office.

## 2024-08-09 NOTE — PROGRESS NOTES
-----------------------------------------------------------------  ENDOCRINE CLINIC NOTE  -----------------------------------------------------------------        PATIENT NAME: Quynh Du  PATIENT : 1955 AGE: 68 y.o.  MRN NUMBER: [id number]  PRIMARY CARE: Cheryl Perez APRN    ==========================================================================    CHIEF COMPLAINT: Type 2 diabetes management  DATE OF SERVICE: 24    ==========================================================================    HPI / SUBJECTIVE    68 y.o. female is seen in the clinic today for type 2 diabetes management.    -Diagnosis Date: 2024  -Last known A1c: 8.5% in 2024    -Current Therapy / Medications:  Metformin 500 mgs once a day    -Past tried medications: (Not currently using)  None    -Home BG logs / monitoring: None    -Meals / Dietary Habits: Two meals a day    -Last eye exam: None  -Neuropathy: None  -Nephropathy: None  -Smoking: Former and quit 20 yrs ago  -No CAD or CVA  -Reports intermittent UTIs  -No history of pancreatitis or thyroid cancer    ==========================================================================                                                PAST MEDICAL HISTORY    Past Medical History:   Diagnosis Date    Asthma     COPD (chronic obstructive pulmonary disease)     Depression     Hyperlipidemia     Hypertension     Impaired fasting glucose     Obesity        ==========================================================================    PAST SURGICAL HISTORY    Past Surgical History:   Procedure Laterality Date    CARDIAC CATHETERIZATION Right 2022    Procedure: Left Heart Cath with angiogram;  Surgeon: Lalo Coleman MD;  Location:  [facility];  Service: Cardiovascular;  Laterality: Right;    CARDIAC CATHETERIZATION N/A 2022    Procedure: Left ventriculography;  Surgeon: Lalo Coleman MD;  Location:  [facility];  Service:  Cardiovascular;  Laterality: N/A;    CARDIAC CATHETERIZATION N/A 2022    Procedure: Coronary angiography;  Surgeon: Lalo Coleman MD;  Location: [facility] INVAS[facility] LOC[facility];  Service: Cardiovascular;  Laterality: N/A;    CHOLECYSTECTOMY      TONSILLECTOMY      TUBAL ABDOMINAL LIGATION         ==========================================================================    FAMILY HISTORY    Family History   Problem Relation Age of Onset    Lung disease Mother     Hyperlipidemia Mother             Hypertension Mother     Hyperlipidemia Father     Hypertension Father     Heart disease Father             Diabetes Brother        ==========================================================================    SOCIAL HISTORY    Social History     Socioeconomic History    Marital status:    Tobacco Use    Smoking status: Former     Current packs/day: 0.00     Average packs/day: 0.5 packs/day for 15.0 years (7.5 ttl pk-yrs)     Types: Cigarettes     Start date: 1994     Quit date: 2009     Years since quitting: 15.0    Smokeless tobacco: Never   Vaping Use    Vaping status: Never Used   Substance and Sexual Activity    Alcohol use: Not Currently     Comment: socially    Drug use: No    Sexual activity: Not Currently     Birth control/protection: Post-menopausal       ==========================================================================    MEDICATIONS      Current Outpatient Medications:     albuterol sulfate  (90 Base) MCG/ACT inhaler, Inhale 2 puffs Every 4 (Four) Hours As Needed for Wheezing., Disp: 18 g, Rfl: 3    aspirin 81 MG EC tablet, Take 1 tablet by mouth Daily., Disp: 90 tablet, Rfl: 1    atorvastatin (LIPITOR) 20 MG tablet, Take 1 tablet by mouth Daily., Disp: 30 tablet, Rfl: 0    BERBERINE CHLORIDE PO, Take 1,000 mg by mouth Daily., Disp: , Rfl:     ipratropium-albuterol (DUO-NEB) 0.5-2.5 mg/3 ml nebulizer, Take 3 mL by nebulization Every 4 (Four) Hours As Needed for  Wheezing. J44.9, Disp: 360 mL, Rfl: 0    losartan (Cozaar) 50 MG tablet, Take 1 tablet by mouth Daily., Disp: 90 tablet, Rfl: 0    metFORMIN ER (GLUCOPHAGE-XR) 500 MG 24 hr tablet, Take 1 tablet by mouth Daily With Breakfast., Disp: 30 tablet, Rfl: 1    triamcinolone (KENALOG) 0.1 % cream, Apply 1 Application topically to the appropriate area as directed 3 (Three) Times a Day., Disp: , Rfl:     ==========================================================================    ALLERGIES    Allergies   Allergen Reactions    Codeine Hives    Naproxen Hives       ==========================================================================    OBJECTIVE    Vitals:    08/09/24 1409   BP: 140/80   Pulse: 97   SpO2: 93%     Body mass index is 32.43 kg/m².     General: Alert, cooperative, no acute distress  Thyroid:  no enlargement/tenderness/palpable nodules  Lungs: Clear to auscultation bilaterally, respirations unlabored  Heart: Regular rate and rhythm, S1 and S2 normal, no murmur, rub or gallop  Abdomen: Soft, NT, ND and Bowel sounds Positive    ==========================================================================    LAB EVALUATION    Lab Results   Component Value Date    GLUCOSE 198 (H) 06/03/2024    BUN 10 06/03/2024    CREATININE 0.79 06/03/2024    EGFRIFNONA 73 12/09/2020    BCR 12.7 06/03/2024    K 3.7 06/03/2024    CO2 29.2 (H) 06/03/2024    CALCIUM 9.4 06/03/2024    ALBUMIN 3.9 06/03/2024    AST 25 06/03/2024    ALT 25 06/03/2024    CHOL 122 06/03/2024    TRIG 127 06/03/2024    HDL 46 06/03/2024    LDL 54 06/03/2024     Lab Results   Component Value Date    HGBA1C 8.55 (H) 06/06/2024     Lab Results   Component Value Date    CREATININE 0.79 06/03/2024     Lab Results   Component Value Date    TSH 3.610 04/07/2022    FREET4 1.23 04/07/2022       ==========================================================================    ASSESSMENT AND PLAN    # Newly diagnosed type 2 diabetes  # Obesity with BMI of 32.43    -  Discussed with patient in detail about diagnosis and management of type 2 diabetes  - Since the diagnosis in June 2024 patient have made significant lifestyle changes and that can be reflected on patient losing weight as well  - Patient is currently afraid of fingersticks and does not want to check blood sugar through pricking and requesting CGM, will order freestyle CGM  - Tolerating metformin therapy therefore we will continue the same regimen  - Patient wants to control type 2 diabetes through lifestyle modification, since this is a new diagnosis agree with repeating A1c back again in 3 months time and if A1c still remains more than 6.5% then we will plan for further pharmacological management  - In the meantime will connect patient to diabetes educators services  - Therapy will stay as:  Metformin 500 mg once a day  - Repeat clinical visit and 3 months time    Thank you for courtesy of consultation.    Return to clinic: 3 months    Entire assessment and plan was discussed and counseled the patient in detail to which patient verbalized understanding and agreed with care.  Answered all queries and concerns.    Part of this note may be an electronic transcription/translation of spoken language to printed text using the Dragon Dictation System.     Note: Portions of this note may have been copied from previous notes but documentation have been reviewed and edited as necessary to support clinical decision making for today's visit.    ==========================================================================    INFORMATION PROVIDED TO PATIENT    Patient Instructions   Please,    - Continue to make lifestyle modifications.  - Please make appoint with diabetic educators.  - Continue metformin  mg 1 pill by mouth daily.  - Ideally need to check your blood sugar once a day in the morning, but as requested I have ordered continuous glucose monitoring system called freestyle daniel for you.  Please make sure you bring  the reader with you during a follow-up visit so I can review the blood sugars and plan therapy accordingly.    Plan for repeat blood work nonfasting before next visit.    Thank you for your visit today.    If you have any questions or concerns please feel free to reach out of the office.       ==========================================================================  Tian Gooden MD  Department of Endocrine, Diabetes and Metabolism  [place], [place]  ==========================================================================

## 2024-09-04 RX ORDER — ATORVASTATIN CALCIUM 20 MG/1
20 TABLET, FILM COATED ORAL DAILY
Qty: 30 TABLET | Refills: 0 | Status: SHIPPED | OUTPATIENT
Start: 2024-09-04

## 2024-09-16 ENCOUNTER — OFFICE VISIT (OUTPATIENT)
Dept: ENDOCRINOLOGY | Facility: CLINIC | Age: 69
End: 2024-09-16
Payer: MEDICARE

## 2024-09-16 DIAGNOSIS — E11.9 NEW ONSET TYPE 2 DIABETES MELLITUS: Primary | ICD-10-CM

## 2024-09-16 PROCEDURE — G0108 DIAB MANAGE TRN  PER INDIV: HCPCS | Performed by: DIETITIAN, REGISTERED

## 2024-09-24 ENCOUNTER — OFFICE VISIT (OUTPATIENT)
Dept: FAMILY MEDICINE CLINIC | Facility: CLINIC | Age: 69
End: 2024-09-24
Payer: MEDICARE

## 2024-09-24 VITALS
HEIGHT: 63 IN | DIASTOLIC BLOOD PRESSURE: 82 MMHG | OXYGEN SATURATION: 96 % | HEART RATE: 98 BPM | TEMPERATURE: 98.4 F | RESPIRATION RATE: 16 BRPM | WEIGHT: 177.4 LBS | SYSTOLIC BLOOD PRESSURE: 126 MMHG | BODY MASS INDEX: 31.43 KG/M2

## 2024-09-24 DIAGNOSIS — R23.8 BULLOUS LESION: Primary | ICD-10-CM

## 2024-09-24 DIAGNOSIS — J06.9 VIRAL UPPER RESPIRATORY TRACT INFECTION: ICD-10-CM

## 2024-09-24 DIAGNOSIS — I15.9 SECONDARY HYPERTENSION: Chronic | ICD-10-CM

## 2024-09-24 PROCEDURE — 1126F AMNT PAIN NOTED NONE PRSNT: CPT | Performed by: NURSE PRACTITIONER

## 2024-09-24 PROCEDURE — 1170F FXNL STATUS ASSESSED: CPT | Performed by: NURSE PRACTITIONER

## 2024-09-24 PROCEDURE — 3074F SYST BP LT 130 MM HG: CPT | Performed by: NURSE PRACTITIONER

## 2024-09-24 PROCEDURE — 3052F HG A1C>EQUAL 8.0%<EQUAL 9.0%: CPT | Performed by: NURSE PRACTITIONER

## 2024-09-24 PROCEDURE — 3079F DIAST BP 80-89 MM HG: CPT | Performed by: NURSE PRACTITIONER

## 2024-09-24 PROCEDURE — 99214 OFFICE O/P EST MOD 30 MIN: CPT | Performed by: NURSE PRACTITIONER

## 2024-09-24 RX ORDER — PREDNISONE 10 MG/1
TABLET ORAL
Qty: 20 TABLET | Refills: 0 | Status: SHIPPED | OUTPATIENT
Start: 2024-09-24 | End: 2024-10-02

## 2024-09-24 RX ORDER — TRIAMCINOLONE ACETONIDE 1 MG/G
1 CREAM TOPICAL 3 TIMES DAILY
Qty: 80 G | Refills: 1 | Status: SHIPPED | OUTPATIENT
Start: 2024-09-24

## 2024-09-24 RX ORDER — DOXYCYCLINE 100 MG/1
100 CAPSULE ORAL 2 TIMES DAILY
Qty: 20 CAPSULE | Refills: 0 | Status: SHIPPED | OUTPATIENT
Start: 2024-09-24

## 2024-10-10 ENCOUNTER — OFFICE VISIT (OUTPATIENT)
Dept: FAMILY MEDICINE CLINIC | Facility: CLINIC | Age: 69
End: 2024-10-10
Payer: MEDICARE

## 2024-10-10 VITALS
HEART RATE: 104 BPM | WEIGHT: 177 LBS | RESPIRATION RATE: 16 BRPM | TEMPERATURE: 98.2 F | SYSTOLIC BLOOD PRESSURE: 121 MMHG | HEIGHT: 63 IN | DIASTOLIC BLOOD PRESSURE: 70 MMHG | OXYGEN SATURATION: 94 % | BODY MASS INDEX: 31.36 KG/M2

## 2024-10-10 DIAGNOSIS — E78.2 MIXED HYPERLIPIDEMIA: Chronic | ICD-10-CM

## 2024-10-10 DIAGNOSIS — J44.0 CHRONIC OBSTRUCTIVE PULMONARY DISEASE WITH ACUTE LOWER RESPIRATORY INFECTION: Chronic | ICD-10-CM

## 2024-10-10 DIAGNOSIS — L12.0 BULLOUS PEMPHIGOID: Chronic | ICD-10-CM

## 2024-10-10 DIAGNOSIS — Z00.00 MEDICARE ANNUAL WELLNESS VISIT, SUBSEQUENT: Primary | ICD-10-CM

## 2024-10-10 DIAGNOSIS — I10 PRIMARY HYPERTENSION: Chronic | ICD-10-CM

## 2024-10-10 NOTE — PROGRESS NOTES
Subjective        Quynh Du is a 68 y.o. female.     Chief Complaint   Patient presents with    Medicare Wellness-subsequent       History of Present Illness    The following portions of the patient's history were reviewed and updated as appropriate: allergies, current medications, past family history, past medical history, past social history, past surgical history and problem list.      Current Outpatient Medications:     albuterol sulfate  (90 Base) MCG/ACT inhaler, Inhale 2 puffs Every 4 (Four) Hours As Needed for Wheezing., Disp: 18 g, Rfl: 3    aspirin 81 MG EC tablet, Take 1 tablet by mouth Daily., Disp: 90 tablet, Rfl: 1    atorvastatin (LIPITOR) 20 MG tablet, Take 1 tablet by mouth Daily., Disp: 30 tablet, Rfl: 0    BERBERINE CHLORIDE PO, Take 1,000 mg by mouth Daily., Disp: , Rfl:     doxycycline (VIBRAMYCIN) 100 MG capsule, Take 1 capsule by mouth 2 (Two) Times a Day., Disp: 20 capsule, Rfl: 0    ipratropium-albuterol (DUO-NEB) 0.5-2.5 mg/3 ml nebulizer, Take 3 mL by nebulization Every 4 (Four) Hours As Needed for Wheezing. J44.9, Disp: 360 mL, Rfl: 0    metFORMIN ER (GLUCOPHAGE-XR) 500 MG 24 hr tablet, Take 1 tablet by mouth Daily With Breakfast., Disp: 90 tablet, Rfl: 1    triamcinolone (KENALOG) 0.1 % cream, Apply 1 Application topically to the appropriate area as directed 3 (Three) Times a Day., Disp: 80 g, Rfl: 1    Continuous Glucose  (FreeStyle Harjinder 3 Yulan) device, Use 1 each 4 (Four) Times a Day Before Meals & at Bedtime. (Patient not taking: Reported on 9/24/2024), Disp: 1 each, Rfl: 0    Continuous Glucose Sensor (FreeStyle Harjinder 3 Sensor) misc, Use 1 each 4 (Four) Times a Day Before Meals & at Bedtime. (Patient not taking: Reported on 9/24/2024), Disp: 2 each, Rfl: 5    Recent Results (from the past 4032 hour(s))   Lipid Panel    Collection Time: 06/03/24  9:38 AM    Specimen: Blood   Result Value Ref Range    Total Cholesterol 122 0 - 200 mg/dL    Triglycerides 127 0 -  "150 mg/dL    HDL Cholesterol 46 40 - 60 mg/dL    LDL Cholesterol  54 0 - 100 mg/dL    VLDL Cholesterol 22 5 - 40 mg/dL    LDL/HDL Ratio 1.10    Comprehensive Metabolic Panel    Collection Time: 06/03/24  9:38 AM    Specimen: Blood   Result Value Ref Range    Glucose 198 (H) 65 - 99 mg/dL    BUN 10 8 - 23 mg/dL    Creatinine 0.79 0.57 - 1.00 mg/dL    Sodium 140 136 - 145 mmol/L    Potassium 3.7 3.5 - 5.2 mmol/L    Chloride 101 98 - 107 mmol/L    CO2 29.2 (H) 22.0 - 29.0 mmol/L    Calcium 9.4 8.6 - 10.5 mg/dL    Total Protein 7.1 6.0 - 8.5 g/dL    Albumin 3.9 3.5 - 5.2 g/dL    ALT (SGPT) 25 1 - 33 U/L    AST (SGOT) 25 1 - 32 U/L    Alkaline Phosphatase 137 (H) 39 - 117 U/L    Total Bilirubin 0.7 0.0 - 1.2 mg/dL    Globulin 3.2 gm/dL    A/G Ratio 1.2 g/dL    BUN/Creatinine Ratio 12.7 7.0 - 25.0    Anion Gap 9.8 5.0 - 15.0 mmol/L    eGFR 81.1 >60.0 mL/min/1.73   Hemoglobin A1c    Collection Time: 06/06/24  8:56 AM    Specimen: Blood   Result Value Ref Range    Hemoglobin A1C 8.55 (H) 4.80 - 5.60 %   POCT Glucose    Collection Time: 06/14/24  1:00 PM    Specimen: Blood   Result Value Ref Range    Glucose 145 (A) 70 - 130 mg/dL   POC Glucose    Collection Time: 08/09/24  2:14 PM    Specimen: Blood   Result Value Ref Range    Glucose 117 (H) 70 - 105 mg/dL         Review of Systems    Objective     /70 (BP Location: Left arm, Patient Position: Sitting, Cuff Size: Large Adult)   Pulse 104   Temp 98.2 °F (36.8 °C) (Oral)   Resp 16   Ht 160 cm (63\")   Wt 80.3 kg (177 lb)   SpO2 94%   BMI 31.35 kg/m²     Physical Exam    Result Review :                Assessment & Plan    There are no diagnoses linked to this encounter.  There are no Patient Instructions on file for this visit.    Follow Up   No follow-ups on file.    Patient was given instructions and counseling regarding her condition or for health maintenance advice. Please see specific information pulled into the AVS if appropriate.     Cheryl Perez, " APRN    10/10/24

## 2024-10-10 NOTE — PROGRESS NOTES
Subjective   The ABCs of the Annual Wellness Visit  Medicare Wellness Visit      Quynh Du is a 68 y.o. patient who presents for a Medicare Wellness Visit.    The following portions of the patient's history were reviewed and   updated as appropriate: allergies, current medications, past family history, past medical history, past social history, past surgical history, and problem list.    Compared to one year ago, the patient's physical   health is the same.  Compared to one year ago, the patient's mental   health is the same.    Recent Hospitalizations:  She was not admitted to the hospital during the last year.     Current Medical Providers:  Patient Care Team:  Cheryl Perez APRN as PCP - General (Nurse Practitioner)  Lalo Coleman MD as Consulting Physician (Cardiology)  Tian Gooden MD as Consulting Physician (Endocrinology)    Outpatient Medications Prior to Visit   Medication Sig Dispense Refill    albuterol sulfate  (90 Base) MCG/ACT inhaler Inhale 2 puffs Every 4 (Four) Hours As Needed for Wheezing. 18 g 3    aspirin 81 MG EC tablet Take 1 tablet by mouth Daily. 90 tablet 1    atorvastatin (LIPITOR) 20 MG tablet Take 1 tablet by mouth Daily. 30 tablet 0    BERBERINE CHLORIDE PO Take 1,000 mg by mouth Daily.      doxycycline (VIBRAMYCIN) 100 MG capsule Take 1 capsule by mouth 2 (Two) Times a Day. 20 capsule 0    ipratropium-albuterol (DUO-NEB) 0.5-2.5 mg/3 ml nebulizer Take 3 mL by nebulization Every 4 (Four) Hours As Needed for Wheezing. J44.9 360 mL 0    metFORMIN ER (GLUCOPHAGE-XR) 500 MG 24 hr tablet Take 1 tablet by mouth Daily With Breakfast. 90 tablet 1    triamcinolone (KENALOG) 0.1 % cream Apply 1 Application topically to the appropriate area as directed 3 (Three) Times a Day. 80 g 1    Continuous Glucose  (FreeStyle Harjinder 3 Morrison) device Use 1 each 4 (Four) Times a Day Before Meals & at Bedtime. 1 each 0    Continuous Glucose Sensor (FreeStyle Harjinder 3 Sensor) misc Use 1  "each 4 (Four) Times a Day Before Meals & at Bedtime. 2 each 5     No facility-administered medications prior to visit.     No opioid medication identified on active medication list. I have reviewed chart for other potential  high risk medication/s and harmful drug interactions in the elderly.      Aspirin is on active medication list. Aspirin use is indicated based on review of current medical condition/s. Pros and cons of this therapy have been discussed today. Benefits of this medication outweigh potential harm.  Patient has been encouraged to continue taking this medication.  .      Patient Active Problem List   Diagnosis    COPD (chronic obstructive pulmonary disease)    Depression    Severe obesity with body mass index (BMI) of 36.0 to 36.9 with serious comorbidity    Medicare annual wellness visit, subsequent    Dyspnea    Secondary hypertension    Mixed hyperlipidemia    Ischemia of heart, chronic    Angina pectoris    Pure hypercholesterolemia    Primary hypertension    Abnormal nuclear stress test    Bullous lesion    New onset type 2 diabetes mellitus    Lightheadedness    Viral upper respiratory tract infection     Advance Care Planning Advance Directive is not on file.  ACP discussion was held with the patient during this visit. Patient has an advance directive (not in EMR), copy requested.            Objective   Vitals:    10/10/24 1506   BP: 121/70   BP Location: Left arm   Patient Position: Sitting   Cuff Size: Large Adult   Pulse: 104   Resp: 16   Temp: 98.2 °F (36.8 °C)   TempSrc: Oral   SpO2: 94%   Weight: 80.3 kg (177 lb)   Height: 160 cm (63\")   PainSc: 0-No pain       Estimated body mass index is 31.35 kg/m² as calculated from the following:    Height as of this encounter: 160 cm (63\").    Weight as of this encounter: 80.3 kg (177 lb).    BMI is >= 30 and <35. (Class 1 Obesity). The following options were offered after discussion;: weight loss educational material (shared in after visit " summary)       Does the patient have evidence of cognitive impairment? No                                                                                                Health  Risk Assessment    Smoking Status:  Social History     Tobacco Use   Smoking Status Former    Current packs/day: 0.00    Average packs/day: 0.5 packs/day for 15.0 years (7.5 ttl pk-yrs)    Types: Cigarettes    Start date: 8/2/1994    Quit date: 8/2/2009    Years since quitting: 15.2   Smokeless Tobacco Never     Alcohol Consumption:  Social History     Substance and Sexual Activity   Alcohol Use Not Currently    Comment: socially       Fall Risk Screen  STEADI Fall Risk Assessment was completed, and patient is at LOW risk for falls.Assessment completed on:10/10/2024    Depression Screening:      10/10/2024     3:05 PM   PHQ-2/PHQ-9 Depression Screening   Little Interest or Pleasure in Doing Things 0-->not at all   Feeling Down, Depressed or Hopeless 0-->not at all   PHQ-9: Brief Depression Severity Measure Score 0     Health Habits and Functional and Cognitive Screening:      10/10/2024     3:06 PM   Functional & Cognitive Status   Do you have difficulty preparing food and eating? No   Do you have difficulty bathing yourself, getting dressed or grooming yourself? No   Do you have difficulty using the toilet? No   Do you have difficulty moving around from place to place? No   Do you have trouble with steps or getting out of a bed or a chair? No   Current Diet Well Balanced Diet   Dental Exam Not up to date   Eye Exam Not up to date   Exercise (times per week) 2 times per week   Current Exercises Include Walking;Treadmill;House Cleaning;Yard Work   Do you need help using the phone?  No   Are you deaf or do you have serious difficulty hearing?  No   Do you need help to go to places out of walking distance? No   Do you need help shopping? No   Do you need help preparing meals?  No   Do you need help with housework?  No   Do you need help with  laundry? No   Do you need help taking your medications? No   Do you need help managing money? No   Do you ever drive or ride in a car without wearing a seat belt? No   Have you felt unusual stress, anger or loneliness in the last month? No   Who do you live with? Alone   If you need help, do you have trouble finding someone available to you? No   Have you been bothered in the last four weeks by sexual problems? No   Do you have difficulty concentrating, remembering or making decisions? No           Age-appropriate Screening Schedule:  Refer to the list below for future screening recommendations based on patient's age, sex and/or medical conditions. Orders for these recommended tests are listed in the plan section. The patient has been provided with a written plan.    Health Maintenance List  Health Maintenance   Topic Date Due    URINE MICROALBUMIN  Never done    DXA SCAN  Never done    COLORECTAL CANCER SCREENING  Never done    DIABETIC EYE EXAM  Never done    TDAP/TD VACCINES (1 - Tdap) Never done    DIABETIC FOOT EXAM  Never done    BMI FOLLOWUP  09/13/2024    HEMOGLOBIN A1C  12/06/2024    LIPID PANEL  06/03/2025    ANNUAL WELLNESS VISIT  10/10/2025    HEPATITIS C SCREENING  Completed    INFLUENZA VACCINE  Completed    Pneumococcal Vaccine 65+  Completed    ZOSTER VACCINE  Completed    COVID-19 Vaccine  Discontinued    MAMMOGRAM  Discontinued                                                                                                                                                CMS Preventative Services Quick Reference  Risk Factors Identified During Encounter  Immunizations Discussed/Encouraged: Tdap, Influenza, Shingrix, COVID19, and RSV (Respiratory Syncytial Virus)    The above risks/problems have been discussed with the patient.  Pertinent information has been shared with the patient in the After Visit Summary.  An After Visit Summary and PPPS were made available to the patient.    Follow Up:   Next  "Medicare Wellness visit to be scheduled in 1 year.         Additional E&M Note during same encounter follows:  Patient has additional, significant, and separately identifiable condition(s)/problem(s) that require work above and beyond the Medicare Wellness Visit     Chief Complaint  Medicare Wellness-subsequent    Subjective   HPI      Review of Systems   Respiratory: Negative.     Cardiovascular: Negative.    Gastrointestinal: Negative.    Endocrine: Negative.    Musculoskeletal: Negative.    Skin: Negative.         Diagnosis of bullous pemphigold.saw dermatolory referred to rheumatology    Allergic/Immunologic: Negative.    Neurological:  Negative for seizures and confusion.   Hematological: Negative.    Psychiatric/Behavioral:  Negative for behavioral problems and dysphoric mood.    All other systems reviewed and are negative.   Patient is here for management of her chronic medical problems: DM, copd hyperlipidemia hypertension.    DM followed by endocrinology, monitoring BS has been on steroids a lot in past for COPD. Has hypertension. Metformin  one daily she checks blood sugar rarely will not check daily.     White coat hypertension always high in office nomeds she is monitoring this: 104/63 118/68 119/67 126/75 125/79 112/63 112 67 115/70  never high at home.     Copd using albutrol as needed and duoneb every 4 hrs if needed .   Will get flu and covid today.     Hyperlipidemia taking atorvastatin 20 mg daily.             Objective   Vital Signs:  /70 (BP Location: Left arm, Patient Position: Sitting, Cuff Size: Large Adult)   Pulse 104   Temp 98.2 °F (36.8 °C) (Oral)   Resp 16   Ht 160 cm (63\")   Wt 80.3 kg (177 lb)   SpO2 94%   BMI 31.35 kg/m²   Physical Exam  Vitals and nursing note reviewed.   HENT:      Head: Normocephalic.      Right Ear: Tympanic membrane normal.      Left Ear: Tympanic membrane normal.      Nose: Nose normal.      Mouth/Throat:      Mouth: Mucous membranes are moist. "   Eyes:      Pupils: Pupils are equal, round, and reactive to light.   Cardiovascular:      Rate and Rhythm: Normal rate and regular rhythm.      Pulses: Normal pulses.      Heart sounds: Normal heart sounds.   Pulmonary:      Effort: Pulmonary effort is normal.      Breath sounds: Normal breath sounds.   Abdominal:      Palpations: Abdomen is soft.   Musculoskeletal:         General: Normal range of motion.   Skin:     General: Skin is warm.   Neurological:      General: No focal deficit present.      Mental Status: She is alert.   Psychiatric:         Mood and Affect: Mood normal.                 Assessment and Plan               Bullous pemphigoid  Referral to rheumatology  Chronic obstructive pulmonary disease with acute lower respiratory infection    Stable   Medicare annual wellness visit, subsequent  Immunizatins completed  Primary hypertension    stable  Mixed hyperlipidemia     Stable.     Orders Placed This Encounter   Procedures    Fluzone High-Dose 65+yrs (3339-6321)    COVID-19 (Pfizer) 12yrs+ (COMIRNATY)    Ambulatory Referral to Rheumatology     Referral Priority:   Routine     Referral Type:   Consultation     Referral Reason:   Specialty Services Required     Requested Specialty:   Rheumatology     Number of Visits Requested:   1             Follow Up   Return in about 6 months (around 4/10/2025).  Patient was given instructions and counseling regarding her condition or for health maintenance advice. Please see specific information pulled into the AVS if appropriate.

## 2024-11-05 DIAGNOSIS — E11.65 TYPE 2 DIABETES MELLITUS WITH HYPERGLYCEMIA, WITHOUT LONG-TERM CURRENT USE OF INSULIN: Primary | ICD-10-CM

## 2024-11-05 DIAGNOSIS — E11.9 NEW ONSET TYPE 2 DIABETES MELLITUS: ICD-10-CM

## 2024-11-18 RX ORDER — ATORVASTATIN CALCIUM 20 MG/1
20 TABLET, FILM COATED ORAL DAILY
Qty: 30 TABLET | Refills: 0 | Status: SHIPPED | OUTPATIENT
Start: 2024-11-18

## 2024-11-21 LAB — HBA1C MFR BLD: 6.4 % (ref 4.8–5.6)

## 2024-11-27 ENCOUNTER — OFFICE VISIT (OUTPATIENT)
Dept: ENDOCRINOLOGY | Facility: CLINIC | Age: 69
End: 2024-11-27
Payer: MEDICARE

## 2024-11-27 VITALS
BODY MASS INDEX: 30.3 KG/M2 | HEART RATE: 92 BPM | WEIGHT: 171 LBS | SYSTOLIC BLOOD PRESSURE: 140 MMHG | DIASTOLIC BLOOD PRESSURE: 80 MMHG | HEIGHT: 63 IN | OXYGEN SATURATION: 92 %

## 2024-11-27 DIAGNOSIS — E66.9 OBESITY (BMI 30-39.9): ICD-10-CM

## 2024-11-27 DIAGNOSIS — E11.65 TYPE 2 DIABETES MELLITUS WITH HYPERGLYCEMIA, WITHOUT LONG-TERM CURRENT USE OF INSULIN: Primary | ICD-10-CM

## 2024-11-27 PROCEDURE — 3044F HG A1C LEVEL LT 7.0%: CPT | Performed by: INTERNAL MEDICINE

## 2024-11-27 PROCEDURE — 99214 OFFICE O/P EST MOD 30 MIN: CPT | Performed by: INTERNAL MEDICINE

## 2024-11-27 PROCEDURE — 1159F MED LIST DOCD IN RCRD: CPT | Performed by: INTERNAL MEDICINE

## 2024-11-27 PROCEDURE — 3079F DIAST BP 80-89 MM HG: CPT | Performed by: INTERNAL MEDICINE

## 2024-11-27 PROCEDURE — 3077F SYST BP >= 140 MM HG: CPT | Performed by: INTERNAL MEDICINE

## 2024-11-27 PROCEDURE — 1160F RVW MEDS BY RX/DR IN RCRD: CPT | Performed by: INTERNAL MEDICINE

## 2024-11-27 RX ORDER — METFORMIN HYDROCHLORIDE 500 MG/1
500 TABLET, EXTENDED RELEASE ORAL
Qty: 90 TABLET | Refills: 3 | Status: SHIPPED | OUTPATIENT
Start: 2024-11-27

## 2024-11-27 NOTE — PROGRESS NOTES
-----------------------------------------------------------------  ENDOCRINE CLINIC NOTE  -----------------------------------------------------------------        PATIENT NAME: Quynh Du  PATIENT : 1955 AGE: 68 y.o.  MRN NUMBER: [id number]  PRIMARY CARE: Cheryl Perez APRN    ==========================================================================    CHIEF COMPLAINT: Type 2 diabetes management  DATE OF SERVICE: 24    ==========================================================================    HPI / SUBJECTIVE    68 y.o. female is seen in the clinic today for type 2 diabetes management.  Diagnosis Date: 2024  Current Therapy / Medications:  Metformin 500 mgs once a day  Past tried medications: (Not currently using)  None  Home BG logs / monitoring: None, afraid of fingersticks  Meals / Dietary Habits: Two meals a day, exercise about three times a week  Last eye exam: None  Neuropathy: None  Nephropathy: None  Smoking: Former and quit 20 yrs ago  No CAD or CVA  Reports intermittent UTIs  No history of pancreatitis or thyroid cancer    ==========================================================================                                                PAST MEDICAL HISTORY    Past Medical History:   Diagnosis Date    Anxiety     Asthma     COPD (chronic obstructive pulmonary disease)     Depression     Hyperlipidemia     Hypertension     Impaired fasting glucose     Obesity        ==========================================================================    PAST SURGICAL HISTORY    Past Surgical History:   Procedure Laterality Date    CARDIAC CATHETERIZATION Right 2022    Procedure: Left Heart Cath with angiogram;  Surgeon: Lalo Coleman MD;  Location: [facility] CATH INVASIVE LOCATION;  Service: Cardiovascular;  Laterality: Right;    CARDIAC CATHETERIZATION N/A 2022    Procedure: Left ventriculography;  Surgeon: Lalo Coleman MD;  Location:  [facility] CATH INVASIVE LOCATION;   Service: Cardiovascular;  Laterality: N/A;    CARDIAC CATHETERIZATION N/A 2022    Procedure: Coronary angiography;  Surgeon: Lalo Coleman MD;  Location: [facility] CATH INVASIVE LOCATION;  Service: Cardiovascular;  Laterality: N/A;    CHOLECYSTECTOMY      TONSILLECTOMY      TUBAL ABDOMINAL LIGATION         ==========================================================================    FAMILY HISTORY    Family History   Problem Relation Age of Onset    Lung disease Mother     Hyperlipidemia Mother             Hypertension Mother     Hyperlipidemia Father     Hypertension Father     Heart disease Father             Diabetes Brother        ==========================================================================    SOCIAL HISTORY    Social History     Socioeconomic History    Marital status:    Tobacco Use    Smoking status: Former     Current packs/day: 0.00     Average packs/day: 0.5 packs/day for 15.0 years (7.5 ttl pk-yrs)     Types: Cigarettes     Start date: 1994     Quit date: 2009     Years since quitting: 15.3    Smokeless tobacco: Never   Vaping Use    Vaping status: Never Used   Substance and Sexual Activity    Alcohol use: Not Currently     Comment: socially    Drug use: No    Sexual activity: Not Currently     Birth control/protection: Post-menopausal       ==========================================================================    MEDICATIONS      Current Outpatient Medications:     albuterol sulfate  (90 Base) MCG/ACT inhaler, Inhale 2 puffs Every 4 (Four) Hours As Needed for Wheezing., Disp: 18 g, Rfl: 3    aspirin 81 MG EC tablet, Take 1 tablet by mouth Daily., Disp: 90 tablet, Rfl: 1    atorvastatin (LIPITOR) 20 MG tablet, Take 1 tablet by mouth Daily., Disp: 30 tablet, Rfl: 0    BERBERINE CHLORIDE PO, Take 1,000 mg by mouth Daily., Disp: , Rfl:     ipratropium-albuterol (DUO-NEB) 0.5-2.5 mg/3 ml nebulizer, Take 3 mL by nebulization Every 4 (Four) Hours As  Needed for Wheezing. J44.9, Disp: 360 mL, Rfl: 0    metFORMIN ER (GLUCOPHAGE-XR) 500 MG 24 hr tablet, Take 1 tablet by mouth Daily With Breakfast., Disp: 90 tablet, Rfl: 3    triamcinolone (KENALOG) 0.1 % cream, Apply 1 Application topically to the appropriate area as directed 3 (Three) Times a Day., Disp: 80 g, Rfl: 1    ==========================================================================    ALLERGIES    Allergies   Allergen Reactions    Codeine Hives    Naproxen Hives       ==========================================================================    OBJECTIVE    Vitals:    11/27/24 1009   BP: 140/80   Pulse: 92   SpO2: 92%     Body mass index is 30.29 kg/m².     General: Alert, cooperative, no acute distress  Thyroid:  no enlargement/tenderness/palpable nodules  Lungs: Clear to auscultation bilaterally, respirations unlabored  Heart: Regular rate and rhythm, S1 and S2 normal, no murmur, rub or gallop  Abdomen: Soft, NT, ND and Bowel sounds Positive    ==========================================================================    LAB EVALUATION    Lab Results   Component Value Date    GLUCOSE 198 (H) 06/03/2024    BUN 10 06/03/2024    CREATININE 0.79 06/03/2024    EGFRIFNONA 73 12/09/2020    BCR 12.7 06/03/2024    K 3.7 06/03/2024    CO2 29.2 (H) 06/03/2024    CALCIUM 9.4 06/03/2024    ALBUMIN 3.9 06/03/2024    AST 25 06/03/2024    ALT 25 06/03/2024    CHOL 122 06/03/2024    TRIG 127 06/03/2024    HDL 46 06/03/2024    LDL 54 06/03/2024     Lab Results   Component Value Date    HGBA1C 6.4 (H) 11/20/2024    HGBA1C 8.55 (H) 06/06/2024     Lab Results   Component Value Date    CREATININE 0.79 06/03/2024     Lab Results   Component Value Date    TSH 3.610 04/07/2022    FREET4 1.23 04/07/2022       ==========================================================================    ASSESSMENT AND PLAN    # Newly diagnosed type 2 diabetes  # Obesity with BMI of 30.29    - Patient A1c is currently at acceptable limit  -  Patient is tolerating metformin therapy without any issues  - Patient is currently complaining of losing hair, will check TSH and free T4 levels  - Patient establish ophthalmology visit for diabetic eye exam  - Otherwise we will continue current therapy without any changes and patient encouraged to continue lifestyle modification through exercise and dietary changes  - Therapy will stay as:  Metformin 500 mg once a day  - Repeat clinical visit in 6 months time    Thank you for courtesy of consultation.    Return to clinic: 6 months    Entire assessment and plan was discussed and counseled the patient in detail to which patient verbalized understanding and agreed with care.  Answered all queries and concerns.    Part of this note may be an electronic transcription/translation of spoken language to printed text using the Dragon Dictation System.     Note: Portions of this note may have been copied from previous notes but documentation have been reviewed and edited as necessary to support clinical decision making for today's visit.    ==========================================================================    INFORMATION PROVIDED TO PATIENT    Patient Instructions   Please,    - Continue to make lifestyle modifications.  - Continue metformin  mg 1 pill by mouth daily.  - Please make appointment with pulmonologist/eye doctor for diabetes eye exam.    - Since you are complaining of hair loss I have ordered thyroid function evaluation which you can complete in next couple of days.    Plan for repeat blood work fasting before next visit.    Thank you for your visit today.    If you have any questions or concerns please feel free to reach out of the office.       ==========================================================================  Tian Gooden MD  Department of Endocrine, Diabetes and Metabolism  [place] [place]  ==========================================================================

## 2024-11-27 NOTE — PATIENT INSTRUCTIONS
Please,    - Continue to make lifestyle modifications.  - Continue metformin  mg 1 pill by mouth daily.  - Please make appointment with pulmonologist/eye doctor for diabetes eye exam.    - Since you are complaining of hair loss I have ordered thyroid function evaluation which you can complete in next couple of days.    Plan for repeat blood work fasting before next visit.    Thank you for your visit today.    If you have any questions or concerns please feel free to reach out of the office.

## 2024-12-06 LAB
T4 FREE SERPL-MCNC: 1.41 NG/DL (ref 0.82–1.77)
TSH SERPL DL<=0.005 MIU/L-ACNC: 3.14 UIU/ML (ref 0.45–4.5)

## 2025-02-14 RX ORDER — ATORVASTATIN CALCIUM 20 MG/1
20 TABLET, FILM COATED ORAL DAILY
Qty: 30 TABLET | Refills: 0 | Status: SHIPPED | OUTPATIENT
Start: 2025-02-14

## 2025-03-25 ENCOUNTER — TELEPHONE (OUTPATIENT)
Dept: FAMILY MEDICINE CLINIC | Facility: CLINIC | Age: 70
End: 2025-03-25
Payer: MEDICARE

## 2025-03-27 RX ORDER — ATORVASTATIN CALCIUM 20 MG/1
20 TABLET, FILM COATED ORAL DAILY
Qty: 30 TABLET | Refills: 0 | Status: SHIPPED | OUTPATIENT
Start: 2025-03-27

## 2025-03-28 NOTE — TELEPHONE ENCOUNTER
This prescription request for atorvastatin was refilled as a 30 day. She is still requesting a 90 day refill.     Please advise.

## 2025-04-28 RX ORDER — ATORVASTATIN CALCIUM 20 MG/1
20 TABLET, FILM COATED ORAL DAILY
Qty: 30 TABLET | Refills: 0 | Status: SHIPPED | OUTPATIENT
Start: 2025-04-28

## 2025-05-15 ENCOUNTER — OFFICE VISIT (OUTPATIENT)
Dept: FAMILY MEDICINE CLINIC | Facility: CLINIC | Age: 70
End: 2025-05-15
Payer: MEDICARE

## 2025-05-15 VITALS
RESPIRATION RATE: 18 BRPM | HEART RATE: 86 BPM | BODY MASS INDEX: 31.04 KG/M2 | DIASTOLIC BLOOD PRESSURE: 83 MMHG | HEIGHT: 63 IN | SYSTOLIC BLOOD PRESSURE: 146 MMHG | OXYGEN SATURATION: 95 % | WEIGHT: 175.2 LBS | TEMPERATURE: 98.6 F

## 2025-05-15 DIAGNOSIS — E78.2 MIXED HYPERLIPIDEMIA: Chronic | ICD-10-CM

## 2025-05-15 DIAGNOSIS — J44.0 CHRONIC OBSTRUCTIVE PULMONARY DISEASE WITH ACUTE LOWER RESPIRATORY INFECTION: Chronic | ICD-10-CM

## 2025-05-15 DIAGNOSIS — L13.9 BULLOUS DERMATOSES: Primary | Chronic | ICD-10-CM

## 2025-05-15 DIAGNOSIS — E11.9 NEW ONSET TYPE 2 DIABETES MELLITUS: Chronic | ICD-10-CM

## 2025-05-15 DIAGNOSIS — R68.2 DRY MOUTH: Chronic | ICD-10-CM

## 2025-05-15 PROBLEM — E66.09 CLASS 1 OBESITY DUE TO EXCESS CALORIES WITH SERIOUS COMORBIDITY AND BODY MASS INDEX (BMI) OF 31.0 TO 31.9 IN ADULT: Status: ACTIVE | Noted: 2025-05-15

## 2025-05-15 PROBLEM — R23.8 BULLOUS LESION: Status: RESOLVED | Noted: 2023-03-16 | Resolved: 2025-05-15

## 2025-05-15 PROBLEM — E66.811 CLASS 1 OBESITY DUE TO EXCESS CALORIES WITH SERIOUS COMORBIDITY AND BODY MASS INDEX (BMI) OF 31.0 TO 31.9 IN ADULT: Status: ACTIVE | Noted: 2025-05-15

## 2025-05-15 PROBLEM — J06.9 VIRAL UPPER RESPIRATORY TRACT INFECTION: Status: RESOLVED | Noted: 2024-09-24 | Resolved: 2025-05-15

## 2025-05-15 PROBLEM — I15.9 SECONDARY HYPERTENSION: Status: RESOLVED | Noted: 2022-04-07 | Resolved: 2025-05-15

## 2025-05-15 RX ORDER — TRIAMCINOLONE ACETONIDE 1 MG/G
1 CREAM TOPICAL 3 TIMES DAILY
Qty: 453.6 G | Refills: 1 | Status: SHIPPED | OUTPATIENT
Start: 2025-05-15

## 2025-05-15 NOTE — PROGRESS NOTES
Subjective        Quynh Du is a 69 y.o. female.     Chief Complaint   Patient presents with    Diabetes     6 month fl/u       History of Present Illness  Patient is here for management of her chronic medical problems: new diagnosis bullous dermatosis (bullous diabeticorum ), DM, hyperlipidemia , copd.     Diabetes mellitus metformin  mg one at breakfast has lost weight A1C 11/2024 6.4 from 8.55. followed by endocrinology. Has hyperlipidemia. Concerns for bullous diabeticorum on arms, feet (worse) . She does not check blood sugars.     Hyperlipidemia she has had weight loss and she is diabetic. She has been well controlled.   She currently has bullous dermatosis and started 3 years ago. She did start atorvastatin during that time.   Will stop atorvastatin for 3 months see if this changes the bullous dermatosis.     Copd duo nebs as prescribed. Has albuterol as needed has had no recent flares.    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   Followed by dermatology using triamcinolone and this seems help if she starts at erruption.  Referred to wound care today.     Dry mouth off and on she is diabetic with bullous dermatosis.  Will test for sjogren's.         The following portions of the patient's history were reviewed and updated as appropriate: allergies, current medications, past family history, past medical history, past social history, past surgical history and problem list.      Current Outpatient Medications:     albuterol sulfate  (90 Base) MCG/ACT inhaler, Inhale 2 puffs Every 4 (Four) Hours As Needed for Wheezing., Disp: 18 g, Rfl: 3    aspirin 81 MG EC tablet, Take 1 tablet by mouth Daily., Disp: 90 tablet, Rfl: 1    BERBERINE CHLORIDE PO, Take 1,000 mg by mouth Daily., Disp: , Rfl:     ipratropium-albuterol (DUO-NEB) 0.5-2.5 mg/3 ml nebulizer, Take 3 mL by nebulization Every 4 (Four) Hours As Needed for Wheezing. J44.9, Disp: 360 mL, Rfl: 0    " metFORMIN ER (GLUCOPHAGE-XR) 500 MG 24 hr tablet, Take 1 tablet by mouth Daily With Breakfast., Disp: 90 tablet, Rfl: 3    triamcinolone (KENALOG) 0.1 % cream, Apply 1 Application topically to the appropriate area as directed 3 (Three) Times a Day., Disp: 453.6 g, Rfl: 1    Recent Results (from the past 24 weeks)   TSH+Free T4    Collection Time: 12/05/24  9:21 AM   Result Value Ref Range    TSH 3.140 0.450 - 4.500 uIU/mL    Free T4 1.41 0.82 - 1.77 ng/dL         Review of Systems    Objective     /83 (BP Location: Left arm, Patient Position: Sitting, Cuff Size: Adult)   Pulse 86   Temp 98.6 °F (37 °C) (Oral)   Resp 18   Ht 160 cm (62.99\")   Wt 79.5 kg (175 lb 3.2 oz)   SpO2 95%   BMI 31.04 kg/m²     Physical Exam  Vitals and nursing note reviewed.   Constitutional:       Appearance: Normal appearance.   HENT:      Head: Normocephalic.      Right Ear: Tympanic membrane and external ear normal.      Left Ear: Tympanic membrane and external ear normal. There is no impacted cerumen.      Nose: Nose normal.      Mouth/Throat:      Mouth: Mucous membranes are moist.   Eyes:      Pupils: Pupils are equal, round, and reactive to light.   Cardiovascular:      Rate and Rhythm: Normal rate and regular rhythm.      Pulses: Normal pulses.      Heart sounds: Normal heart sounds.   Pulmonary:      Effort: Pulmonary effort is normal.      Breath sounds: Normal breath sounds.   Abdominal:      Palpations: Abdomen is soft.   Musculoskeletal:      Cervical back: Neck supple.        Feet:    Feet:      Comments: Blister 3rd toe right   Healing scabbed area dorsum left foot   Scarred areas on right and left ankles    Skin:     General: Skin is warm.      Comments: Bullous lesion left dorsum proximal 5th phalange  Few scattered healing bullous lesions both arms.    Neurological:      General: No focal deficit present.      Mental Status: She is alert and oriented to person, place, and time.   Psychiatric:         Mood and " Affect: Mood normal.         Thought Content: Thought content normal.         Result Review :                Assessment & Plan    Diagnoses and all orders for this visit:    1. Bullous dermatoses (Primary)  Comments:  recent punch biopsy Poor subepidermal bullous dermatosis diff includes bullous diabeticorum  Orders:  -     Ambulatory Referral to Wound Clinic    2. New onset type 2 diabetes mellitus  Comments:  labs upcoming . followed by endocrinology.   currently referred wound care for bullous dermatosis  Orders:  -     Ambulatory Referral to Wound Clinic  -     Comprehensive Metabolic Panel; Future  -     Hemoglobin A1c; Future    3. Chronic obstructive pulmonary disease with acute lower respiratory infection  Comments:  stable    4. Mixed hyperlipidemia  Comments:  stop statin looking for causation bullous dermatosis    5. Dry mouth  Comments:  testing ordered for sjorgen's  Orders:  -     Sjogren's Antibody, Anti-SS-A / -SS-B; Future    Other orders  -     triamcinolone (KENALOG) 0.1 % cream; Apply 1 Application topically to the appropriate area as directed 3 (Three) Times a Day.  Dispense: 453.6 g; Refill: 1      Patient Instructions   You will hear from wound care. Continue the triamcinolone  F/u on lab results.  Drink water.   Further treatment pending result.s    Follow Up   Return in about 6 months (around 11/15/2025), or if symptoms worsen or fail to improve.    Patient was given instructions and counseling regarding her condition or for health maintenance advice. Please see specific information pulled into the AVS if appropriate.     Cheryl Perez, APRN    05/15/25

## 2025-05-15 NOTE — ASSESSMENT & PLAN NOTE
Patient's (Body mass index is 31.04 kg/m².) indicates that they are obese (BMI >30) with health conditions that include hypertension, diabetes mellitus, and dyslipidemias . Weight is improving with lifestyle modifications. BMI  is above average; BMI management plan is completed. We discussed portion control and increasing exercise.

## 2025-05-15 NOTE — PATIENT INSTRUCTIONS
You will hear from wound care. Continue the triamcinolone  F/u on lab results.  Drink water.   Further treatment pending result.s

## 2025-05-27 ENCOUNTER — PATIENT ROUNDING (BHMG ONLY) (OUTPATIENT)
Dept: URGENT CARE | Facility: CLINIC | Age: 70
End: 2025-05-27
Payer: MEDICARE

## 2025-05-27 DIAGNOSIS — E11.9 NEW ONSET TYPE 2 DIABETES MELLITUS: ICD-10-CM

## 2025-05-27 DIAGNOSIS — L13.9 BULLOUS DERMATOSES: Primary | ICD-10-CM

## 2025-05-27 NOTE — ED NOTES
Thank you for letting us care for you in your recent visit to our urgent care center. We would love to hear about your experience with us. Was this the first time you have visited our location?    We’re always looking for ways to make our patients’ experiences even better. Do you have any recommendations on ways we may improve?     I appreciate you taking the time to respond. Please be on the lookout for a survey about your recent visit from [employer] via text or email. We would greatly appreciate if you could fill that out and turn it back in. We want your voice to be heard and we value your feedback.   Thank you for choosing [facility] for your healthcare needs.

## 2025-06-03 ENCOUNTER — OFFICE VISIT (OUTPATIENT)
Dept: WOUND CARE | Facility: HOSPITAL | Age: 70
End: 2025-06-03
Payer: MEDICARE

## 2025-06-03 ENCOUNTER — TELEPHONE (OUTPATIENT)
Dept: FAMILY MEDICINE CLINIC | Facility: CLINIC | Age: 70
End: 2025-06-03
Payer: MEDICARE

## 2025-06-03 PROCEDURE — G0463 HOSPITAL OUTPT CLINIC VISIT: HCPCS

## 2025-06-03 NOTE — TELEPHONE ENCOUNTER
I called the patient ,right now we have held her atorvastatin due to current medical condition she is can hold it for 3 months.  Will talk to her about her referral I am redoing her referral because we wanted her to see Dr. Cisneros rhematologshea in [place]

## 2025-06-09 ENCOUNTER — LAB (OUTPATIENT)
Dept: LAB | Facility: HOSPITAL | Age: 70
End: 2025-06-09
Payer: MEDICARE

## 2025-06-09 ENCOUNTER — TRANSCRIBE ORDERS (OUTPATIENT)
Dept: ADMINISTRATIVE | Facility: HOSPITAL | Age: 70
End: 2025-06-09
Payer: MEDICARE

## 2025-06-09 DIAGNOSIS — R68.2 DRY MOUTH: Chronic | ICD-10-CM

## 2025-06-09 DIAGNOSIS — L98.499 DIABETES MELLITUS WITH SKIN ULCER: ICD-10-CM

## 2025-06-09 DIAGNOSIS — E11.622 DIABETES MELLITUS WITH SKIN ULCER: ICD-10-CM

## 2025-06-09 DIAGNOSIS — E11.622 DIABETES MELLITUS WITH SKIN ULCER: Primary | ICD-10-CM

## 2025-06-09 DIAGNOSIS — E11.9 NEW ONSET TYPE 2 DIABETES MELLITUS: Chronic | ICD-10-CM

## 2025-06-09 DIAGNOSIS — L98.499 DIABETES MELLITUS WITH SKIN ULCER: Primary | ICD-10-CM

## 2025-06-09 LAB
ALBUMIN SERPL-MCNC: 4 G/DL (ref 3.5–5.2)
ALBUMIN/GLOB SERPL: 1.3 G/DL
ALP SERPL-CCNC: 98 U/L (ref 39–117)
ALT SERPL W P-5'-P-CCNC: 21 U/L (ref 1–33)
ANION GAP SERPL CALCULATED.3IONS-SCNC: 9.1 MMOL/L (ref 5–15)
AST SERPL-CCNC: 22 U/L (ref 1–32)
BILIRUB SERPL-MCNC: 0.6 MG/DL (ref 0–1.2)
BUN SERPL-MCNC: 11 MG/DL (ref 8–23)
BUN/CREAT SERPL: 14.9 (ref 7–25)
CALCIUM SPEC-SCNC: 9.1 MG/DL (ref 8.6–10.5)
CHLORIDE SERPL-SCNC: 103 MMOL/L (ref 98–107)
CO2 SERPL-SCNC: 29.9 MMOL/L (ref 22–29)
CREAT SERPL-MCNC: 0.74 MG/DL (ref 0.57–1)
EGFRCR SERPLBLD CKD-EPI 2021: 87.2 ML/MIN/1.73
GLOBULIN UR ELPH-MCNC: 3.2 GM/DL
GLUCOSE SERPL-MCNC: 136 MG/DL (ref 65–99)
HBA1C MFR BLD: 5.8 % (ref 4.8–5.6)
POTASSIUM SERPL-SCNC: 3.5 MMOL/L (ref 3.5–5.2)
PREALB SERPL-MCNC: 20.5 MG/DL (ref 20–40)
PROT SERPL-MCNC: 7.2 G/DL (ref 6–8.5)
SODIUM SERPL-SCNC: 142 MMOL/L (ref 136–145)

## 2025-06-09 PROCEDURE — 86235 NUCLEAR ANTIGEN ANTIBODY: CPT

## 2025-06-09 PROCEDURE — 83036 HEMOGLOBIN GLYCOSYLATED A1C: CPT

## 2025-06-09 PROCEDURE — 80053 COMPREHEN METABOLIC PANEL: CPT

## 2025-06-09 PROCEDURE — 84134 ASSAY OF PREALBUMIN: CPT

## 2025-06-09 PROCEDURE — 36415 COLL VENOUS BLD VENIPUNCTURE: CPT

## 2025-06-10 ENCOUNTER — OFFICE VISIT (OUTPATIENT)
Dept: WOUND CARE | Facility: HOSPITAL | Age: 70
End: 2025-06-10
Payer: MEDICARE

## 2025-06-10 LAB
ENA SS-A AB SER-ACNC: 0.2 AI (ref 0–0.9)
ENA SS-B AB SER-ACNC: <0.2 AI (ref 0–0.9)

## 2025-06-10 PROCEDURE — G0463 HOSPITAL OUTPT CLINIC VISIT: HCPCS

## 2025-06-11 ENCOUNTER — DOCUMENTATION (OUTPATIENT)
Dept: WOUND CARE | Facility: HOSPITAL | Age: 70
End: 2025-06-11
Payer: MEDICARE

## 2025-06-13 ENCOUNTER — RESULTS FOLLOW-UP (OUTPATIENT)
Dept: FAMILY MEDICINE CLINIC | Facility: CLINIC | Age: 70
End: 2025-06-13
Payer: MEDICARE

## 2025-06-13 NOTE — LETTER
Quynh Du  [address] [place] [address]    June 13, 2025     Dear MsJaquan Florian:    Below are the results from your recent visit:    Resulted Orders   Comprehensive Metabolic Panel   Result Value Ref Range    Glucose 136 (H) 65 - 99 mg/dL    BUN 11.0 8.0 - 23.0 mg/dL    Creatinine 0.74 0.57 - 1.00 mg/dL    Sodium 142 136 - 145 mmol/L    Potassium 3.5 3.5 - 5.2 mmol/L    Chloride 103 98 - 107 mmol/L    CO2 29.9 (H) 22.0 - 29.0 mmol/L    Calcium 9.1 8.6 - 10.5 mg/dL    Total Protein 7.2 6.0 - 8.5 g/dL    Albumin 4.0 3.5 - 5.2 g/dL    ALT (SGPT) 21 1 - 33 U/L    AST (SGOT) 22 1 - 32 U/L    Alkaline Phosphatase 98 39 - 117 U/L    Total Bilirubin 0.6 0.0 - 1.2 mg/dL    Globulin 3.2 gm/dL    A/G Ratio 1.3 g/dL    BUN/Creatinine Ratio 14.9 7.0 - 25.0    Anion Gap 9.1 5.0 - 15.0 mmol/L    eGFR 87.2 >60.0 mL/min/1.73   Hemoglobin A1c   Result Value Ref Range    Hemoglobin A1C 5.80 (H) 4.80 - 5.60 %       Copy of your current lab results.  Your A1c has improved you are down to 5.8 that is excellent.  Your CMP is normal you were checked for Sjogren's and your labs are negative.    If you have any questions or concerns, please don't hesitate to call.         Sincerely,        ALYSON Wray

## 2025-06-22 RX ORDER — ATORVASTATIN CALCIUM 20 MG/1
20 TABLET, FILM COATED ORAL DAILY
Qty: 30 TABLET | Refills: 0 | Status: SHIPPED | OUTPATIENT
Start: 2025-06-22

## 2025-08-11 ENCOUNTER — LAB (OUTPATIENT)
Dept: LAB | Facility: HOSPITAL | Age: 70
End: 2025-08-11
Payer: MEDICARE

## 2025-08-11 DIAGNOSIS — E11.65 TYPE 2 DIABETES MELLITUS WITH HYPERGLYCEMIA, WITHOUT LONG-TERM CURRENT USE OF INSULIN: ICD-10-CM

## 2025-08-11 DIAGNOSIS — E11.9 NEW ONSET TYPE 2 DIABETES MELLITUS: ICD-10-CM

## 2025-08-11 LAB
ALBUMIN SERPL-MCNC: 3.7 G/DL (ref 3.5–5.2)
ALBUMIN UR-MCNC: 1.2 MG/DL
ALBUMIN/GLOB SERPL: 1.2 G/DL
ALP SERPL-CCNC: 101 U/L (ref 39–117)
ALT SERPL W P-5'-P-CCNC: 17 U/L (ref 1–33)
ANION GAP SERPL CALCULATED.3IONS-SCNC: 8.9 MMOL/L (ref 5–15)
AST SERPL-CCNC: 26 U/L (ref 1–32)
BILIRUB SERPL-MCNC: 0.5 MG/DL (ref 0–1.2)
BUN SERPL-MCNC: 11 MG/DL (ref 8–23)
BUN/CREAT SERPL: 13.3 (ref 7–25)
CALCIUM SPEC-SCNC: 9.1 MG/DL (ref 8.6–10.5)
CHLORIDE SERPL-SCNC: 105 MMOL/L (ref 98–107)
CHOLEST SERPL-MCNC: 166 MG/DL (ref 0–200)
CO2 SERPL-SCNC: 29.1 MMOL/L (ref 22–29)
CREAT SERPL-MCNC: 0.83 MG/DL (ref 0.57–1)
CREAT UR-MCNC: 99.1 MG/DL
EGFRCR SERPLBLD CKD-EPI 2021: 75.9 ML/MIN/1.73
GLOBULIN UR ELPH-MCNC: 3.1 GM/DL
GLUCOSE SERPL-MCNC: 124 MG/DL (ref 65–99)
HBA1C MFR BLD: 5.96 % (ref 4.8–5.6)
HDLC SERPL-MCNC: 48 MG/DL (ref 40–60)
LDLC SERPL CALC-MCNC: 95 MG/DL (ref 0–100)
LDLC/HDLC SERPL: 1.93 {RATIO}
MICROALBUMIN/CREAT UR: 12.1 MG/G (ref 0–29)
POTASSIUM SERPL-SCNC: 3.9 MMOL/L (ref 3.5–5.2)
PROT SERPL-MCNC: 6.8 G/DL (ref 6–8.5)
SODIUM SERPL-SCNC: 143 MMOL/L (ref 136–145)
T4 FREE SERPL-MCNC: 1.27 NG/DL (ref 0.92–1.68)
TRIGL SERPL-MCNC: 128 MG/DL (ref 0–150)
TSH SERPL DL<=0.05 MIU/L-ACNC: 3.2 UIU/ML (ref 0.27–4.2)
VLDLC SERPL-MCNC: 23 MG/DL (ref 5–40)

## 2025-08-11 PROCEDURE — 82570 ASSAY OF URINE CREATININE: CPT

## 2025-08-11 PROCEDURE — 84443 ASSAY THYROID STIM HORMONE: CPT

## 2025-08-11 PROCEDURE — 80053 COMPREHEN METABOLIC PANEL: CPT

## 2025-08-11 PROCEDURE — 36415 COLL VENOUS BLD VENIPUNCTURE: CPT

## 2025-08-11 PROCEDURE — 80061 LIPID PANEL: CPT

## 2025-08-11 PROCEDURE — 82043 UR ALBUMIN QUANTITATIVE: CPT

## 2025-08-11 PROCEDURE — 83036 HEMOGLOBIN GLYCOSYLATED A1C: CPT

## 2025-08-11 PROCEDURE — 84439 ASSAY OF FREE THYROXINE: CPT

## 2025-08-15 ENCOUNTER — OFFICE VISIT (OUTPATIENT)
Dept: ENDOCRINOLOGY | Facility: CLINIC | Age: 70
End: 2025-08-15
Payer: MEDICARE

## 2025-08-15 VITALS
HEIGHT: 63 IN | OXYGEN SATURATION: 97 % | BODY MASS INDEX: 31.36 KG/M2 | DIASTOLIC BLOOD PRESSURE: 80 MMHG | SYSTOLIC BLOOD PRESSURE: 145 MMHG | WEIGHT: 177 LBS | HEART RATE: 88 BPM

## 2025-08-15 DIAGNOSIS — E66.9 OBESITY (BMI 30-39.9): ICD-10-CM

## 2025-08-15 DIAGNOSIS — E11.65 TYPE 2 DIABETES MELLITUS WITH HYPERGLYCEMIA, WITHOUT LONG-TERM CURRENT USE OF INSULIN: Primary | ICD-10-CM

## 2025-08-15 PROCEDURE — 3077F SYST BP >= 140 MM HG: CPT | Performed by: INTERNAL MEDICINE

## 2025-08-15 PROCEDURE — 1160F RVW MEDS BY RX/DR IN RCRD: CPT | Performed by: INTERNAL MEDICINE

## 2025-08-15 PROCEDURE — 99214 OFFICE O/P EST MOD 30 MIN: CPT | Performed by: INTERNAL MEDICINE

## 2025-08-15 PROCEDURE — 1159F MED LIST DOCD IN RCRD: CPT | Performed by: INTERNAL MEDICINE

## 2025-08-15 PROCEDURE — 3079F DIAST BP 80-89 MM HG: CPT | Performed by: INTERNAL MEDICINE

## 2025-08-15 PROCEDURE — 3044F HG A1C LEVEL LT 7.0%: CPT | Performed by: INTERNAL MEDICINE

## 2025-08-15 RX ORDER — METFORMIN HYDROCHLORIDE 500 MG/1
500 TABLET, EXTENDED RELEASE ORAL
Qty: 90 TABLET | Refills: 3 | Status: SHIPPED | OUTPATIENT
Start: 2025-08-15

## (undated) DEVICE — CATH DIAG IMPULSE FL4 6F 100CM

## (undated) DEVICE — PINNACLE INTRODUCER SHEATH: Brand: PINNACLE

## (undated) DEVICE — DRAPE, RADIAL, STERILE: Brand: MEDLINE

## (undated) DEVICE — PK TRY HEART CATH 50

## (undated) DEVICE — GW DIAG EMERALD HEPCOAT MOVE JTIP STD .035 3MM 150CM

## (undated) DEVICE — ANGIO-SEAL VIP VASCULAR CLOSURE DEVICE: Brand: ANGIO-SEAL

## (undated) DEVICE — CATH DIAG IMPULSE PIG .056 6F 110CM

## (undated) DEVICE — CATH DIAG IMPULSE FR4 6F 100CM